# Patient Record
Sex: MALE | Race: WHITE | NOT HISPANIC OR LATINO | URBAN - METROPOLITAN AREA
[De-identification: names, ages, dates, MRNs, and addresses within clinical notes are randomized per-mention and may not be internally consistent; named-entity substitution may affect disease eponyms.]

---

## 2021-06-17 ENCOUNTER — OFFICE VISIT (OUTPATIENT)
Dept: URBAN - METROPOLITAN AREA CLINIC 105 | Facility: CLINIC | Age: 76
End: 2021-06-17
Payer: COMMERCIAL

## 2021-06-17 VITALS
WEIGHT: 150 LBS | HEART RATE: 74 BPM | DIASTOLIC BLOOD PRESSURE: 76 MMHG | SYSTOLIC BLOOD PRESSURE: 152 MMHG | TEMPERATURE: 97.9 F | BODY MASS INDEX: 23.54 KG/M2 | HEIGHT: 67 IN

## 2021-06-17 DIAGNOSIS — K29.60 EROSIVE GASTRITIS: ICD-10-CM

## 2021-06-17 DIAGNOSIS — R63.0 ANOREXIA: ICD-10-CM

## 2021-06-17 DIAGNOSIS — K52.832 LYMPHOCYTIC COLITIS: ICD-10-CM

## 2021-06-17 DIAGNOSIS — F10.10 ALCOHOL ABUSE: ICD-10-CM

## 2021-06-17 DIAGNOSIS — R19.7 DIARRHEA, UNSPECIFIED TYPE: ICD-10-CM

## 2021-06-17 DIAGNOSIS — Z86.010 HISTORY OF COLON POLYPS: ICD-10-CM

## 2021-06-17 DIAGNOSIS — M19.90 ARTHRITIS: ICD-10-CM

## 2021-06-17 DIAGNOSIS — F32.9 DEPRESSION, UNSPECIFIED DEPRESSION TYPE: ICD-10-CM

## 2021-06-17 DIAGNOSIS — D53.9 MACROCYTIC ANEMIA: ICD-10-CM

## 2021-06-17 DIAGNOSIS — R10.9 ABDOMINAL PAIN, UNSPECIFIED ABDOMINAL LOCATION: ICD-10-CM

## 2021-06-17 DIAGNOSIS — E53.8 VITAMIN B12 DEFICIENCY: ICD-10-CM

## 2021-06-17 PROCEDURE — 99213 OFFICE O/P EST LOW 20 MIN: CPT | Performed by: INTERNAL MEDICINE

## 2021-06-17 RX ORDER — DUTASTERIDE 0.5 MG/1
TAKE 1 CAPSULE (0.5 MG) BY ORAL ROUTE ONCE DAILY CAPSULE, LIQUID FILLED ORAL 1
Qty: 0 | Refills: 0 | Status: ACTIVE | COMMUNITY
Start: 1900-01-01 | End: 1900-01-01

## 2021-06-17 RX ORDER — HYALURONATE SODIUM 0.2 %
APPLY TO AFFECTED AREA(S) CREAM (GRAM) TOPICAL
Qty: 1 | Refills: 0 | Status: ACTIVE | COMMUNITY
Start: 1900-01-01 | End: 1900-01-01

## 2021-06-17 RX ORDER — PREDNISONE 5 MG/1
TAKE 1 TABLET (5 MG) BY ORAL ROUTE ONCE DAILY TABLET ORAL 1
Qty: 0 | Refills: 0 | Status: ACTIVE | COMMUNITY
Start: 1900-01-01 | End: 1900-01-01

## 2021-06-17 RX ORDER — GABAPENTIN 300 MG/1
TAKE 1 CAPSULE (300 MG) BY ORAL ROUTE ONCE PER DAY CAPSULE ORAL
Qty: 0 | Refills: 0 | Status: ACTIVE | COMMUNITY
Start: 1900-01-01 | End: 1900-01-01

## 2021-06-17 RX ORDER — AZATHIOPRINE 50 1/1
TAKE 1 TABLET (50 MG) BY ORAL ROUTE ONCE DAILY TABLET ORAL 1
Qty: 0 | Refills: 0 | Status: ACTIVE | COMMUNITY
Start: 1900-01-01 | End: 1900-01-01

## 2021-06-17 RX ORDER — SERTRALINE 100 MG/1
TAKE 1 TABLET (100 MG) BY ORAL ROUTE ONCE DAILY FOR THE 2 WEEKS BEFORE THE ONSET OF MENSES TABLET, FILM COATED ORAL 1
Qty: 0 | Refills: 0 | Status: ACTIVE | COMMUNITY
Start: 1900-01-01 | End: 1900-01-01

## 2021-06-17 RX ORDER — TACROLIMUS 1 MG/1
6MG IN THE AM AND 7MG IN THE PM CAPSULE, GELATIN COATED ORAL
Qty: 0 | Refills: 0 | Status: ACTIVE | COMMUNITY
Start: 1900-01-01 | End: 1900-01-01

## 2021-06-17 RX ORDER — MIRTAZAPINE 7.5 MG/1
TAKE 1 TABLET (7.5 MG) BY ORAL ROUTE ONCE DAILY AT BEDTIME TABLET ORAL 1
Qty: 0 | Refills: 0 | Status: ACTIVE | COMMUNITY
Start: 1900-01-01 | End: 1900-01-01

## 2021-06-17 RX ORDER — DOXEPIN HYDROCHLORIDE 10 MG/1
TAKE 1 CAPSULE BY ORAL ROUTE AS NEEDED CAPSULE ORAL
Qty: 0 | Refills: 0 | Status: ACTIVE | COMMUNITY
Start: 1900-01-01 | End: 1900-01-01

## 2021-06-17 RX ORDER — FOLIC ACID 1 MG/1
TAKE 1 TABLET (1 MG) BY ORAL ROUTE ONCE DAILY TABLET ORAL 1
Qty: 0 | Refills: 0 | Status: ACTIVE | COMMUNITY
Start: 1900-01-01 | End: 1900-01-01

## 2021-06-17 NOTE — HPI-TODAY'S VISIT:
The patient is a /White male, who presents in follow-up for lymphocytic colitis and erosive gastritis noted on his EGD on 2/27/17. Patient has a history of arthritis, peritoneal dialysis (no longer on), knee replacement in October 2015, and inguinal/umbilical hernia repair 2/2017 by Dr. Ponce. Tramadol helped with his arthritis pain. He was seen in the [facility] ED 2/1/17 because of left groin pain. A left inguinal hernia was reduced. A CT in the ED noted some gastric wall thickening. He had stopped taking Tramadol.   His regimen consisted of Entocort 3 mg daily (1/9/17 Entocort resumed at 9 mg daily and decreased to 6 mg 2/6/17, decreased to 3 mg daily on 4/14/17) and Welchol 625 mg, 3 pills daily with lunch. He has been off the omeprazole 20 mg daily and denies any upper GI issues.   On clinic visit 7/14/17, pt reported intermittent watery urgent stools on a daily basis x 2 weeks. Since then he was started on low dose Imodium 1 pill a day along with budesonide to 3 mg daily. On visit 11/22/17 he stated he was having 3 BMs daily with last one being loose. On 3/2/18, he stated his BMs have improved with 3 BMs daily and noted his stools had become less formed throughout the day. He was on budesonide 3 mg daily and WelChol 3 pills daily. He would use Imodium PRN. He was not able to try the Lomotil. On 5/15/18 he noted he was having formed/solid BMs on Welchol 3 pills daily during the week, budesonide 3 mg daily, and Lomotil 1 pill daily.  On 8/16/18, patient continued on budesonide 3 mg daily and increased to BID because of watery diarrhea.  He discontinued Welchol and continued on Lomotil 1 pill a day.  On this regimen he had a solid BM early AM; the second was formed; the third was liquid.  He stated since his last visit he had a couple of bad falls.  He was taking Naltrexone and had nausea/vomiting/abdominal pain so he stopped.  He stated he had significantly decreased his alcohol use.  He noted a significant decrease in his appetite.  On 10/4/18, the patient reported having nausea along with a headache about twice a week for a few hours which was relieved with ondansetron.  However, he had lost his bottle of medication so he had not used it recently.  The patient noted having 2 BMs per day with formed stool while on Lomotil 1 pill BID. The patient was not clear if he is taking the omeprazole 40 mg QD.  He reported he has continued budesonide 3 mg, 2 pills daily.  After his clinic visit he called back and noted he had misplaced his omeprazole bottle and had not been taking it for some time.  He resumed it.  On 10/04/2018, he stated his nausea and headaches have resolved.  He had not had any diarrhea since his last visit except on 10/04/2018.  He was usually having 2 good formed BMs/day.  He was taking budesonide 3 mg, 2 pills daily and Lomotil 1 pill BID.  On 11/29/18, the patient noted he was having regular bowel habits with formed BM. He was currently on Lomotil 1 pill BID and Budesonide 3 mg 2 pills daily.  He denied any acid reflux symptoms on omeprazole 20 mg daily.  He fell again 2 weeks prior prompting an ED visit.  He suffered bruising to left forearm.   He had an x-ray in the ED.  On 6/6/19, he noted having a renal transplant at [facility] on 1/20/19.  He was on azathioprine 50 mg daily, prednisone 5 mg daily, Prograf 5 mg AM and 6 mg PM.  He was also on B12.  He notes Dr. Joaquin noted a decrease in his hemoglobin recently.  The patient denied any blood/melena per rectum.  He was off budesonide and did not take an anti-diarrheal.  He was having a daily formed BM and can 2x/week have a second BM later in the day that is watery.  He also notes urgency.  On 7/18/19, he was following up after an EGD/Colonoscopy.  He continued on B12 daily.  He was having a formed BM 2x/day on Imodium 1 pill daily.  His colonoscopy was negative for microscopic colitis.  He had 1-2 alcohol beverages a month.  On 10/28/19, he said he d/c valacyclovir. He was now taking mirtazapine for depression. His BMs were formed 90% of the time. He had 1-2 BMs/day. He took Imodium 1 pill QD. He had been noting intermittent lower abdominal discomfort in the past month. He had it 3-4x/week and it lasted for about an hour. He described it as an urge to have a BM, but didn't. He denied cramping, bloating, and distention. He d/c omeprazole.   He had 1 beer/day. He would have 1-2 glasses of wine when guests were over. He typically had guests 2x/week. He said he did not have a good appetite. He was trying to eat more. Before his kidney transplant, he was 140 lbs. Afterwards, he dropped down to 130 lbs and said he couldn't manage to go over 135 lbs. He was not drinking Ensure or Boost. He said his depression had gotten worse, so he went to a psychiatrist who adjusted his mirtazapine dosage.   On 12/5/19, he said his creatinine had been elevated to around 1.9 (previously 1.5) and tomorrow he had a biopsy with a transplant team. He d/c the appeitite stimulant/megestrol 400 mg daily after taking for 2-3 weeks. His appetite had improved. He was eating a lot. He put on 5-6 lbs. He had only taken hyoscyamine 0.125 mg once for abdominal discomfort and it worked within 30 min. He still took Imodium 1 pill QD. He said it was effective. He only drank socially and had up to 2 glasses of wine.  Today, he says his bowel habit is fine. He takes Imodium 1 pill QD. He has 1-2 formed BMs/day. He has not been experiencing reflux symptoms or abdominal pain. His appetite is adequate. He is off vit B12. He continues on iron.  He has 2 alcohol drinks/night on the weekends and 1-2 drinks/night 1x during the week but is trying to hold on use during the week. He had COVID in Dec and lost 20 lbs during that time and was subsequently put on Marinol by his PCP. He has had recent labwork with his PCP.   Labs 7/22/19 - CMP normal except BUN 29, creatinine 1.48, sodium 134, GFR 47. CBC normal except hgb 9.8, .2. Ferritin 344.40, TIBC 246, iron sat 55%, UIBC 110, transferrin 198, vitamin B-12 1253. Folate normal. 5/10/19 Hgb 9.4, .2, B12 > 2000, Folate 10.

## 2022-05-06 ENCOUNTER — LAB OUTSIDE AN ENCOUNTER (OUTPATIENT)
Dept: URBAN - METROPOLITAN AREA TELEHEALTH 2 | Facility: TELEHEALTH | Age: 77
End: 2022-05-06

## 2022-05-06 ENCOUNTER — OFFICE VISIT (OUTPATIENT)
Dept: URBAN - METROPOLITAN AREA TELEHEALTH 2 | Facility: TELEHEALTH | Age: 77
End: 2022-05-06
Payer: COMMERCIAL

## 2022-05-06 DIAGNOSIS — R19.7 DIARRHEA, UNSPECIFIED TYPE: ICD-10-CM

## 2022-05-06 DIAGNOSIS — K52.832 LYMPHOCYTIC COLITIS: ICD-10-CM

## 2022-05-06 DIAGNOSIS — K29.60 EROSIVE GASTRITIS: ICD-10-CM

## 2022-05-06 DIAGNOSIS — R63.0 ANOREXIA: ICD-10-CM

## 2022-05-06 PROCEDURE — 99214 OFFICE O/P EST MOD 30 MIN: CPT | Performed by: INTERNAL MEDICINE

## 2022-05-06 RX ORDER — MIRTAZAPINE 7.5 MG/1
TAKE 1 TABLET (7.5 MG) BY ORAL ROUTE ONCE DAILY AT BEDTIME TABLET ORAL 1
Qty: 0 | Refills: 0 | Status: ACTIVE | COMMUNITY
Start: 1900-01-01

## 2022-05-06 RX ORDER — TACROLIMUS 1 MG/1
6MG IN THE AM AND 7MG IN THE PM CAPSULE, GELATIN COATED ORAL
Qty: 0 | Refills: 0 | Status: ACTIVE | COMMUNITY
Start: 1900-01-01

## 2022-05-06 RX ORDER — GABAPENTIN 300 MG/1
TAKE 1 CAPSULE (300 MG) BY ORAL ROUTE ONCE PER DAY CAPSULE ORAL
Qty: 0 | Refills: 0 | Status: ACTIVE | COMMUNITY
Start: 1900-01-01

## 2022-05-06 RX ORDER — MEGESTROL ACETATE 40 MG/ML
10 ML SUSPENSION ORAL ONCE A DAY
Qty: 300 ML | Refills: 2 | OUTPATIENT
Start: 2022-05-06 | End: 2022-08-04

## 2022-05-06 RX ORDER — SODIUM, POTASSIUM,MAG SULFATES 17.5-3.13G
354 ML SOLUTION, RECONSTITUTED, ORAL ORAL ONCE
Qty: 354 ML | Refills: 0 | OUTPATIENT
Start: 2022-05-06 | End: 2022-05-07

## 2022-05-06 RX ORDER — TRAVOPROST 0.04 MG/ML
INSTILL 1 DROP IN BOTH EYES EVERY MORNING SOLUTION/ DROPS OPHTHALMIC
Qty: 7.5 | Refills: 1 | Status: ACTIVE | COMMUNITY

## 2022-05-06 RX ORDER — HYALURONATE SODIUM 0.2 %
APPLY TO AFFECTED AREA(S) CREAM (GRAM) TOPICAL
Qty: 1 | Refills: 0 | Status: ACTIVE | COMMUNITY
Start: 1900-01-01

## 2022-05-06 RX ORDER — CARVEDILOL 3.12 MG/1
TAKE ONE TABLET BY MOUTH EVERY 12 HOURS TABLET, FILM COATED ORAL
Qty: 60 | Refills: 0 | Status: ACTIVE | COMMUNITY

## 2022-05-06 RX ORDER — DUTASTERIDE 0.5 MG/1
TAKE 1 CAPSULE (0.5 MG) BY ORAL ROUTE ONCE DAILY CAPSULE, LIQUID FILLED ORAL 1
Qty: 0 | Refills: 0 | Status: ACTIVE | COMMUNITY
Start: 1900-01-01

## 2022-05-06 RX ORDER — AMLODIPINE BESYLATE 10 MG/1
TABLET ORAL
Qty: 90 | Status: ACTIVE | COMMUNITY

## 2022-05-06 RX ORDER — PREDNISONE 5 MG/1
TAKE 1 TABLET (5 MG) BY ORAL ROUTE ONCE DAILY TABLET ORAL 1
Qty: 0 | Refills: 0 | Status: ACTIVE | COMMUNITY
Start: 1900-01-01

## 2022-05-06 RX ORDER — AZATHIOPRINE 50 1/1
TAKE 1 TABLET (50 MG) BY ORAL ROUTE ONCE DAILY TABLET ORAL 1
Qty: 0 | Refills: 0 | Status: ACTIVE | COMMUNITY
Start: 1900-01-01

## 2022-05-06 RX ORDER — SERTRALINE 100 MG/1
TAKE 1 TABLET (100 MG) BY ORAL ROUTE ONCE DAILY FOR THE 2 WEEKS BEFORE THE ONSET OF MENSES TABLET, FILM COATED ORAL 1
Qty: 0 | Refills: 0 | Status: ACTIVE | COMMUNITY
Start: 1900-01-01

## 2022-05-06 RX ORDER — FOLIC ACID 1 MG/1
TAKE 1 TABLET (1 MG) BY ORAL ROUTE ONCE DAILY TABLET ORAL 1
Qty: 0 | Refills: 0 | Status: ACTIVE | COMMUNITY
Start: 1900-01-01

## 2022-05-06 RX ORDER — DOXEPIN HYDROCHLORIDE 10 MG/1
TAKE 1 CAPSULE BY ORAL ROUTE AS NEEDED CAPSULE ORAL
Qty: 0 | Refills: 0 | Status: ACTIVE | COMMUNITY
Start: 1900-01-01

## 2022-05-06 NOTE — HPI-TODAY'S VISIT:
PAST MEDICAL HISTORY: The patient is a /White male, who presents in follow-up for lymphocytic colitis and erosive gastritis noted on his EGD on 2/27/17. Patient has a history of arthritis, peritoneal dialysis (no longer on), knee replacement in October 2015, and inguinal/umbilical hernia repair 2/2017 by Dr. Ponce. Tramadol helped with his arthritis pain. He was seen in the [facility] ED 2/1/17 because of left groin pain. A left inguinal hernia was reduced. A CT in the ED noted some gastric wall thickening. He had stopped taking Tramadol.   His regimen consisted of Entocort 3 mg daily (1/9/17 Entocort resumed at 9 mg daily and decreased to 6 mg 2/6/17, decreased to 3 mg daily on 4/14/17) and Welchol 625 mg, 3 pills daily with lunch. He has been off the omeprazole 20 mg daily and denies any upper GI issues.   On clinic visit 7/14/17, pt reported intermittent watery urgent stools on a daily basis x 2 weeks. Since then he was started on low dose Imodium 1 pill a day along with budesonide to 3 mg daily. On visit 11/22/17 he stated he was having 3 BMs daily with last one being loose. On 3/2/18, he stated his BMs have improved with 3 BMs daily and noted his stools had become less formed throughout the day. He was on budesonide 3 mg daily and WelChol 3 pills daily. He would use Imodium PRN. He was not able to try the Lomotil. On 5/15/18 he noted he was having formed/solid BMs on Welchol 3 pills daily during the week, budesonide 3 mg daily, and Lomotil 1 pill daily.  On 8/16/18, patient continued on budesonide 3 mg daily and increased to BID because of watery diarrhea.  He discontinued Welchol and continued on Lomotil 1 pill a day.  On this regimen he had a solid BM early AM; the second was formed; the third was liquid.  He stated since his last visit he had a couple of bad falls.  He was taking Naltrexone and had nausea/vomiting/abdominal pain so he stopped.  He stated he had significantly decreased his alcohol use.  He noted a significant decrease in his appetite.  On 10/4/18, the patient reported having nausea along with a headache about twice a week for a few hours which was relieved with ondansetron.  However, he had lost his bottle of medication so he had not used it recently.  The patient noted having 2 BMs per day with formed stool while on Lomotil 1 pill BID. The patient was not clear if he is taking the omeprazole 40 mg QD.  He reported he has continued budesonide 3 mg, 2 pills daily.  After his clinic visit he called back and noted he had misplaced his omeprazole bottle and had not been taking it for some time.  He resumed it.  On 10/04/2018, he stated his nausea and headaches have resolved.  He had not had any diarrhea since his last visit except on 10/04/2018.  He was usually having 2 good formed BMs/day.  He was taking budesonide 3 mg, 2 pills daily and Lomotil 1 pill BID.  On 11/29/18, the patient noted he was having regular bowel habits with formed BM. He was currently on Lomotil 1 pill BID and Budesonide 3 mg 2 pills daily.  He denied any acid reflux symptoms on omeprazole 20 mg daily.  He fell again 2 weeks prior prompting an ED visit.  He suffered bruising to left forearm.   He had an x-ray in the ED.  On 6/6/19, he noted having a renal transplant at [facility] on 1/20/19.  He was on azathioprine 50 mg daily, prednisone 5 mg daily, Prograf 5 mg AM and 6 mg PM.  He was also on B12.  He notes Dr. Joaquin noted a decrease in his hemoglobin recently.  The patient denied any blood/melena per rectum.  He was off budesonide and did not take an anti-diarrheal.  He was having a daily formed BM and can 2x/week have a second BM later in the day that is watery.  He also notes urgency.  On 7/18/19, he was following up after an EGD/Colonoscopy.  He continued on B12 daily.  He was having a formed BM 2x/day on Imodium 1 pill daily.  His colonoscopy was negative for microscopic colitis.  He had 1-2 alcohol beverages a month.  On 10/28/19, he said he d/c valacyclovir. He was now taking mirtazapine for depression. His BMs were formed 90% of the time. He had 1-2 BMs/day. He took Imodium 1 pill QD. He had been noting intermittent lower abdominal discomfort in the past month. He had it 3-4x/week and it lasted for about an hour. He described it as an urge to have a BM, but didn't. He denied cramping, bloating, and distention. He d/c omeprazole.   He had 1 beer/day. He would have 1-2 glasses of wine when guests were over. He typically had guests 2x/week. He said he did not have a good appetite. He was trying to eat more. Before his kidney transplant, he was 140 lbs. Afterwards, he dropped down to 130 lbs and said he couldn't manage to go over 135 lbs. He was not drinking Ensure or Boost. He said his depression had gotten worse, so he went to a psychiatrist who adjusted his mirtazapine dosage.   On 12/5/19, he said his creatinine had been elevated to around 1.9 (previously 1.5) and tomorrow he had a biopsy with a transplant team. He d/c the appeitite stimulant/megestrol 400 mg daily after taking for 2-3 weeks. His appetite had improved. He was eating a lot. He put on 5-6 lbs. He had only taken hyoscyamine 0.125 mg once for abdominal discomfort and it worked within 30 min. He still took Imodium 1 pill QD. He said it was effective. He only drank socially and had up to 2 glasses of wine.  On 6/17/21, he said his bowel habit was fine. He took Imodium 1 pill QD. He had 1-2 formed BMs/day. He had not been experiencing reflux symptoms or abdominal pain. His appetite was adequate. He was off vit B12. He continued on iron.  He had 2 alcohol drinks/night on the weekends and 1-2 drinks/night 1x during the week but was trying to hold on use during the week. He had COVID in Dec and lost 20 lbs during that time and was subsequently put on Marinol by his PCP. He had recent labwork with his PCP.  HPI:   Today, he says he is on medication for anxiety and depression which works. He had COVID last year and lost 30 lbs as a result. He was put on Marinol to stimulate his appetite and gained 10 lbs, but it is difficult for him to keep his weight up. Dr. Joaquin ordered a CT today. He follows with a nutritionist - 1x/2 weeks - increasing caloric intake. He is not drinking any supplements, but had planned to start drinking Kefir. He is currently 138-139 lbs. He had taken Marinol for 2-3 weeks and his weight increased to 145 lbs.  He notes Marinol caused him to get lost in his own home so he would take it before bed.  He "vaguely" remembers taking Megace, but does not remember the benefit.   He will see Dr. Joaquin next month. Most recent labs were 2 weeks ago. Alcohol use is now only on weekends - 2 glasses of wine on Friday and 2 glasses on Saturday. Recent liver enzyme levels were normal he states. He is not on B12 supplementation or omeprazole.   Labs 5/21/21 - COVID positive. CBC normal except hgb 12.8, .2. CMP normal except BUN 30, creatinine 1.46, GFR 47. Chol 252, , non . 7/22/19 - CMP normal except BUN 29, creatinine 1.48, sodium 134, GFR 47. CBC normal except hgb 9.8, .2. Ferritin 344.40, TIBC 246, iron sat 55%, UIBC 110, transferrin 198, vitamin B-12 1253. Folate normal. 5/10/19 Hgb 9.4, .2, B12 > 2000, Folate 10.

## 2022-05-12 ENCOUNTER — TELEPHONE ENCOUNTER (OUTPATIENT)
Dept: URBAN - METROPOLITAN AREA CLINIC 105 | Facility: CLINIC | Age: 77
End: 2022-05-12

## 2022-05-31 ENCOUNTER — OFFICE VISIT (OUTPATIENT)
Dept: URBAN - METROPOLITAN AREA MEDICAL CENTER 33 | Facility: MEDICAL CENTER | Age: 77
End: 2022-05-31
Payer: COMMERCIAL

## 2022-05-31 DIAGNOSIS — D12.4 ADENOMA OF DESCENDING COLON: ICD-10-CM

## 2022-05-31 DIAGNOSIS — D12.2 ADENOMA OF ASCENDING COLON: ICD-10-CM

## 2022-05-31 DIAGNOSIS — D12.3 ADENOMA OF TRANSVERSE COLON: ICD-10-CM

## 2022-05-31 DIAGNOSIS — D12.5 ADENOMA OF SIGMOID COLON: ICD-10-CM

## 2022-05-31 DIAGNOSIS — Z86.010 ADENOMAS PERSONAL HISTORY OF COLONIC POLYPS: ICD-10-CM

## 2022-05-31 DIAGNOSIS — K63.89 BACTERIAL OVERGROWTH SYNDROME: ICD-10-CM

## 2022-05-31 PROCEDURE — 45380 COLONOSCOPY AND BIOPSY: CPT | Performed by: INTERNAL MEDICINE

## 2022-05-31 PROCEDURE — 45385 COLONOSCOPY W/LESION REMOVAL: CPT | Performed by: INTERNAL MEDICINE

## 2022-05-31 RX ORDER — TRAVOPROST 0.04 MG/ML
INSTILL 1 DROP IN BOTH EYES EVERY MORNING SOLUTION/ DROPS OPHTHALMIC
Qty: 7.5 | Refills: 1 | Status: ACTIVE | COMMUNITY

## 2022-05-31 RX ORDER — FOLIC ACID 1 MG/1
TAKE 1 TABLET (1 MG) BY ORAL ROUTE ONCE DAILY TABLET ORAL 1
Qty: 0 | Refills: 0 | Status: ACTIVE | COMMUNITY
Start: 1900-01-01

## 2022-05-31 RX ORDER — GABAPENTIN 300 MG/1
TAKE 1 CAPSULE (300 MG) BY ORAL ROUTE ONCE PER DAY CAPSULE ORAL
Qty: 0 | Refills: 0 | Status: ACTIVE | COMMUNITY
Start: 1900-01-01

## 2022-05-31 RX ORDER — DUTASTERIDE 0.5 MG/1
TAKE 1 CAPSULE (0.5 MG) BY ORAL ROUTE ONCE DAILY CAPSULE, LIQUID FILLED ORAL 1
Qty: 0 | Refills: 0 | Status: ACTIVE | COMMUNITY
Start: 1900-01-01

## 2022-05-31 RX ORDER — TACROLIMUS 1 MG/1
6MG IN THE AM AND 7MG IN THE PM CAPSULE, GELATIN COATED ORAL
Qty: 0 | Refills: 0 | Status: ACTIVE | COMMUNITY
Start: 1900-01-01

## 2022-05-31 RX ORDER — MIRTAZAPINE 7.5 MG/1
TAKE 1 TABLET (7.5 MG) BY ORAL ROUTE ONCE DAILY AT BEDTIME TABLET ORAL 1
Qty: 0 | Refills: 0 | Status: ACTIVE | COMMUNITY
Start: 1900-01-01

## 2022-05-31 RX ORDER — HYALURONATE SODIUM 0.2 %
APPLY TO AFFECTED AREA(S) CREAM (GRAM) TOPICAL
Qty: 1 | Refills: 0 | Status: ACTIVE | COMMUNITY
Start: 1900-01-01

## 2022-05-31 RX ORDER — MEGESTROL ACETATE 40 MG/ML
10 ML SUSPENSION ORAL ONCE A DAY
Qty: 300 ML | Refills: 2 | Status: ACTIVE | COMMUNITY
Start: 2022-05-06 | End: 2022-08-04

## 2022-05-31 RX ORDER — PREDNISONE 5 MG/1
TAKE 1 TABLET (5 MG) BY ORAL ROUTE ONCE DAILY TABLET ORAL 1
Qty: 0 | Refills: 0 | Status: ACTIVE | COMMUNITY
Start: 1900-01-01

## 2022-05-31 RX ORDER — CARVEDILOL 3.12 MG/1
TAKE ONE TABLET BY MOUTH EVERY 12 HOURS TABLET, FILM COATED ORAL
Qty: 60 | Refills: 0 | Status: ACTIVE | COMMUNITY

## 2022-05-31 RX ORDER — DOXEPIN HYDROCHLORIDE 10 MG/1
TAKE 1 CAPSULE BY ORAL ROUTE AS NEEDED CAPSULE ORAL
Qty: 0 | Refills: 0 | Status: ACTIVE | COMMUNITY
Start: 1900-01-01

## 2022-05-31 RX ORDER — SERTRALINE 100 MG/1
TAKE 1 TABLET (100 MG) BY ORAL ROUTE ONCE DAILY FOR THE 2 WEEKS BEFORE THE ONSET OF MENSES TABLET, FILM COATED ORAL 1
Qty: 0 | Refills: 0 | Status: ACTIVE | COMMUNITY
Start: 1900-01-01

## 2022-05-31 RX ORDER — AMLODIPINE BESYLATE 10 MG/1
TABLET ORAL
Qty: 90 | Status: ACTIVE | COMMUNITY

## 2022-05-31 RX ORDER — AZATHIOPRINE 50 1/1
TAKE 1 TABLET (50 MG) BY ORAL ROUTE ONCE DAILY TABLET ORAL 1
Qty: 0 | Refills: 0 | Status: ACTIVE | COMMUNITY
Start: 1900-01-01

## 2022-07-08 ENCOUNTER — OFFICE VISIT (OUTPATIENT)
Dept: URBAN - METROPOLITAN AREA TELEHEALTH 2 | Facility: TELEHEALTH | Age: 77
End: 2022-07-08
Payer: COMMERCIAL

## 2022-07-08 ENCOUNTER — LAB OUTSIDE AN ENCOUNTER (OUTPATIENT)
Dept: URBAN - METROPOLITAN AREA TELEHEALTH 2 | Facility: TELEHEALTH | Age: 77
End: 2022-07-08

## 2022-07-08 DIAGNOSIS — M19.90 ARTHRITIS: ICD-10-CM

## 2022-07-08 DIAGNOSIS — R63.0 ANOREXIA: ICD-10-CM

## 2022-07-08 DIAGNOSIS — K52.832 LYMPHOCYTIC COLITIS: ICD-10-CM

## 2022-07-08 DIAGNOSIS — R19.7 DIARRHEA, UNSPECIFIED TYPE: ICD-10-CM

## 2022-07-08 PROCEDURE — 99214 OFFICE O/P EST MOD 30 MIN: CPT | Performed by: INTERNAL MEDICINE

## 2022-07-08 RX ORDER — GABAPENTIN 300 MG/1
TAKE 1 CAPSULE (300 MG) BY ORAL ROUTE ONCE PER DAY CAPSULE ORAL
Qty: 0 | Refills: 0 | Status: ACTIVE | COMMUNITY
Start: 1900-01-01

## 2022-07-08 RX ORDER — MEGESTROL ACETATE 40 MG/ML
10 ML SUSPENSION ORAL TWICE A DAY
Qty: 600 ML | Refills: 2 | OUTPATIENT
Start: 2022-05-06 | End: 2022-10-06

## 2022-07-08 RX ORDER — DOXEPIN HYDROCHLORIDE 10 MG/1
TAKE 1 CAPSULE BY ORAL ROUTE AS NEEDED CAPSULE ORAL
Qty: 0 | Refills: 0 | Status: ACTIVE | COMMUNITY
Start: 1900-01-01

## 2022-07-08 RX ORDER — AZATHIOPRINE 50 1/1
TAKE 1 TABLET (50 MG) BY ORAL ROUTE ONCE DAILY TABLET ORAL 1
Qty: 0 | Refills: 0 | Status: ACTIVE | COMMUNITY
Start: 1900-01-01

## 2022-07-08 RX ORDER — SERTRALINE 100 MG/1
TAKE 1 TABLET (100 MG) BY ORAL ROUTE ONCE DAILY FOR THE 2 WEEKS BEFORE THE ONSET OF MENSES TABLET, FILM COATED ORAL 1
Qty: 0 | Refills: 0 | Status: ACTIVE | COMMUNITY
Start: 1900-01-01

## 2022-07-08 RX ORDER — HYALURONATE SODIUM 0.2 %
APPLY TO AFFECTED AREA(S) CREAM (GRAM) TOPICAL
Qty: 1 | Refills: 0 | Status: ACTIVE | COMMUNITY
Start: 1900-01-01

## 2022-07-08 RX ORDER — CARVEDILOL 3.12 MG/1
TAKE ONE TABLET BY MOUTH EVERY 12 HOURS TABLET, FILM COATED ORAL
Qty: 60 | Refills: 0 | Status: ACTIVE | COMMUNITY

## 2022-07-08 RX ORDER — AMLODIPINE BESYLATE 10 MG/1
TABLET ORAL
Qty: 90 | Status: ACTIVE | COMMUNITY

## 2022-07-08 RX ORDER — TACROLIMUS 1 MG/1
6MG IN THE AM AND 7MG IN THE PM CAPSULE, GELATIN COATED ORAL
Qty: 0 | Refills: 0 | Status: ACTIVE | COMMUNITY
Start: 1900-01-01

## 2022-07-08 RX ORDER — PREDNISONE 5 MG/1
TAKE 1 TABLET (5 MG) BY ORAL ROUTE ONCE DAILY TABLET ORAL 1
Qty: 0 | Refills: 0 | Status: ACTIVE | COMMUNITY
Start: 1900-01-01

## 2022-07-08 RX ORDER — TRAVOPROST 0.04 MG/ML
INSTILL 1 DROP IN BOTH EYES EVERY MORNING SOLUTION/ DROPS OPHTHALMIC
Qty: 7.5 | Refills: 1 | Status: ACTIVE | COMMUNITY

## 2022-07-08 RX ORDER — MIRTAZAPINE 7.5 MG/1
TAKE 1 TABLET (7.5 MG) BY ORAL ROUTE ONCE DAILY AT BEDTIME TABLET ORAL 1
Qty: 0 | Refills: 0 | Status: ACTIVE | COMMUNITY
Start: 1900-01-01

## 2022-07-08 RX ORDER — SODIUM, POTASSIUM,MAG SULFATES 17.5-3.13G
354 ML SOLUTION, RECONSTITUTED, ORAL ORAL ONCE
Qty: 354 ML | Refills: 1 | OUTPATIENT
Start: 2022-07-08 | End: 2022-07-10

## 2022-07-08 RX ORDER — MEGESTROL ACETATE 40 MG/ML
10 ML SUSPENSION ORAL ONCE A DAY
Qty: 300 ML | Refills: 2 | Status: ACTIVE | COMMUNITY
Start: 2022-05-06 | End: 2022-08-04

## 2022-07-08 RX ORDER — FOLIC ACID 1 MG/1
TAKE 1 TABLET (1 MG) BY ORAL ROUTE ONCE DAILY TABLET ORAL 1
Qty: 0 | Refills: 0 | Status: ACTIVE | COMMUNITY
Start: 1900-01-01

## 2022-07-08 RX ORDER — DUTASTERIDE 0.5 MG/1
TAKE 1 CAPSULE (0.5 MG) BY ORAL ROUTE ONCE DAILY CAPSULE, LIQUID FILLED ORAL 1
Qty: 0 | Refills: 0 | Status: ACTIVE | COMMUNITY
Start: 1900-01-01

## 2022-07-08 NOTE — HPI-TODAY'S VISIT:
PAST MEDICAL HISTORY: The patient is a /White male, who presents in follow-up for lymphocytic colitis and erosive gastritis noted on his EGD on 2/27/17. Patient has a history of arthritis, peritoneal dialysis (no longer on), knee replacement in October 2015, and inguinal/umbilical hernia repair 2/2017 by Dr. Ponce. Tramadol helped with his arthritis pain. He was seen in the [facility] ED 2/1/17 because of left groin pain. A left inguinal hernia was reduced. A CT in the ED noted some gastric wall thickening. He had stopped taking Tramadol.   His regimen consisted of Entocort 3 mg daily (1/9/17 Entocort resumed at 9 mg daily and decreased to 6 mg 2/6/17, decreased to 3 mg daily on 4/14/17) and Welchol 625 mg, 3 pills daily with lunch. He has been off the omeprazole 20 mg daily and denies any upper GI issues.   On clinic visit 7/14/17, pt reported intermittent watery urgent stools on a daily basis x 2 weeks. Since then he was started on low dose Imodium 1 pill a day along with budesonide to 3 mg daily. On visit 11/22/17 he stated he was having 3 BMs daily with last one being loose. On 3/2/18, he stated his BMs have improved with 3 BMs daily and noted his stools had become less formed throughout the day. He was on budesonide 3 mg daily and WelChol 3 pills daily. He would use Imodium PRN. He was not able to try the Lomotil. On 5/15/18 he noted he was having formed/solid BMs on Welchol 3 pills daily during the week, budesonide 3 mg daily, and Lomotil 1 pill daily.  On 8/16/18, patient continued on budesonide 3 mg daily and increased to BID because of watery diarrhea.  He discontinued Welchol and continued on Lomotil 1 pill a day.  On this regimen he had a solid BM early AM; the second was formed; the third was liquid.  He stated since his last visit he had a couple of bad falls.  He was taking Naltrexone and had nausea/vomiting/abdominal pain so he stopped.  He stated he had significantly decreased his alcohol use.  He noted a significant decrease in his appetite.  On 10/4/18, the patient reported having nausea along with a headache about twice a week for a few hours which was relieved with ondansetron.  However, he had lost his bottle of medication so he had not used it recently.  The patient noted having 2 BMs per day with formed stool while on Lomotil 1 pill BID. The patient was not clear if he is taking the omeprazole 40 mg QD.  He reported he has continued budesonide 3 mg, 2 pills daily.  After his clinic visit he called back and noted he had misplaced his omeprazole bottle and had not been taking it for some time.  He resumed it.  On 10/04/2018, he stated his nausea and headaches have resolved.  He had not had any diarrhea since his last visit except on 10/04/2018.  He was usually having 2 good formed BMs/day.  He was taking budesonide 3 mg, 2 pills daily and Lomotil 1 pill BID.  On 11/29/18, the patient noted he was having regular bowel habits with formed BM. He was currently on Lomotil 1 pill BID and Budesonide 3 mg 2 pills daily.  He denied any acid reflux symptoms on omeprazole 20 mg daily.  He fell again 2 weeks prior prompting an ED visit.  He suffered bruising to left forearm.   He had an x-ray in the ED.  On 6/6/19, he noted having a renal transplant at [place] on 1/20/19.  He was on azathioprine 50 mg daily, prednisone 5 mg daily, Prograf 5 mg AM and 6 mg PM.  He was also on B12.  He notes Dr. Joaquin noted a decrease in his hemoglobin recently.  The patient denied any blood/melena per rectum.  He was off budesonide and did not take an anti-diarrheal.  He was having a daily formed BM and can 2x/week have a second BM later in the day that is watery.  He also notes urgency.  On 7/18/19, he was following up after an EGD/Colonoscopy.  He continued on B12 daily.  He was having a formed BM 2x/day on Imodium 1 pill daily.  His colonoscopy was negative for microscopic colitis.  He had 1-2 alcohol beverages a month.  On 10/28/19, he said he d/c valacyclovir. He was now taking mirtazapine for depression. His BMs were formed 90% of the time. He had 1-2 BMs/day. He took Imodium 1 pill QD. He had been noting intermittent lower abdominal discomfort in the past month. He had it 3-4x/week and it lasted for about an hour. He described it as an urge to have a BM, but didn't. He denied cramping, bloating, and distention. He d/c omeprazole.   He had 1 beer/day. He would have 1-2 glasses of wine when guests were over. He typically had guests 2x/week. He said he did not have a good appetite. He was trying to eat more. Before his kidney transplant, he was 140 lbs. Afterwards, he dropped down to 130 lbs and said he couldn't manage to go over 135 lbs. He was not drinking Ensure or Boost. He said his depression had gotten worse, so he went to a psychiatrist who adjusted his mirtazapine dosage.   On 12/5/19, he said his creatinine had been elevated to around 1.9 (previously 1.5) and tomorrow he had a biopsy with a transplant team. He d/c the appeitite stimulant/megestrol 400 mg daily after taking for 2-3 weeks. His appetite had improved. He was eating a lot. He put on 5-6 lbs. He had only taken hyoscyamine 0.125 mg once for abdominal discomfort and it worked within 30 min. He still took Imodium 1 pill QD. He said it was effective. He only drank socially and had up to 2 glasses of wine.  On 6/17/21, he said his bowel habit was fine. He took Imodium 1 pill QD. He had 1-2 formed BMs/day. He had not been experiencing reflux symptoms or abdominal pain. His appetite was adequate. He was off vit B12. He continued on iron.  He had 2 alcohol drinks/night on the weekends and 1-2 drinks/night 1x during the week but was trying to hold on use during the week. He had COVID in Dec and lost 20 lbs during that time and was subsequently put on Marinol by his PCP. He had recent labwork with his PCP.  On 5/6/22, he said he was on medication for anxiety and depression which worked. He had COVID last year and lost 30 lbs as a result. He was put on Marinol to stimulate his appetite and gained 10 lbs, but it was difficult for him to keep his weight up. Dr. Joaquin ordered a CT today. He followed with a nutritionist - 1x/2 weeks - increasing caloric intake. He was not drinking any supplements, but had planned to start drinking Kefir. He was currently 138-139 lbs. He had taken Marinol for 2-3 weeks and his weight increased to 145 lbs.  He noted Marinol caused him to get lost in his own home so he would take it before bed.  He "vaguely" remembered taking Megace, but did not remember the benefit.   He would see Dr. Joaquin next month. Most recent labs were 2 weeks ago. Alcohol use was now only on weekends - 2 glasses of wine on Friday and 2 glasses on Saturday. Recent liver enzyme levels were normal he stated. He was not on B12 supplementation or omeprazole.  HPI: Today, he says he has been on Megace 400 mg daily for about 5 weeks - some improvement. He works with a nutritionist. He wants to get back above 140 lbs. He has been having urgent BMs - now taking Imodium 1 pill BID which helps. After the colonoscopy, he would have a good BM earlier in the day followed by urgent BMs later. He has 4-5 BMs QD that is watery 1-2x/day.   Labs 5/21/21 - COVID positive. CBC normal except hgb 12.8, .2. CMP normal except BUN 30, creatinine 1.46, GFR 47. Chol 252, , non . 7/22/19 - CMP normal except BUN 29, creatinine 1.48, sodium 134, GFR 47. CBC normal except hgb 9.8, .2. Ferritin 344.40, TIBC 246, iron sat 55%, UIBC 110, transferrin 198, vitamin B-12 1253. Folate normal. 5/10/19 Hgb 9.4, .2, B12 > 2000, Folate 10.

## 2022-07-15 ENCOUNTER — TELEPHONE ENCOUNTER (OUTPATIENT)
Dept: URBAN - METROPOLITAN AREA CLINIC 105 | Facility: CLINIC | Age: 77
End: 2022-07-15

## 2022-07-19 ENCOUNTER — OFFICE VISIT (OUTPATIENT)
Dept: URBAN - METROPOLITAN AREA CLINIC 105 | Facility: CLINIC | Age: 77
End: 2022-07-19
Payer: COMMERCIAL

## 2022-07-19 ENCOUNTER — WEB ENCOUNTER (OUTPATIENT)
Dept: URBAN - METROPOLITAN AREA CLINIC 105 | Facility: CLINIC | Age: 77
End: 2022-07-19

## 2022-07-19 VITALS
BODY MASS INDEX: 24.53 KG/M2 | DIASTOLIC BLOOD PRESSURE: 80 MMHG | WEIGHT: 152.6 LBS | HEIGHT: 66 IN | TEMPERATURE: 97.2 F | SYSTOLIC BLOOD PRESSURE: 157 MMHG | HEART RATE: 77 BPM

## 2022-07-19 DIAGNOSIS — R19.7 DIARRHEA, UNSPECIFIED TYPE: ICD-10-CM

## 2022-07-19 DIAGNOSIS — E53.8 VITAMIN B12 DEFICIENCY: ICD-10-CM

## 2022-07-19 DIAGNOSIS — M19.90 ARTHRITIS: ICD-10-CM

## 2022-07-19 DIAGNOSIS — K29.60 EROSIVE GASTRITIS: ICD-10-CM

## 2022-07-19 DIAGNOSIS — Z86.010 HISTORY OF COLON POLYPS: ICD-10-CM

## 2022-07-19 DIAGNOSIS — R63.0 ANOREXIA: ICD-10-CM

## 2022-07-19 DIAGNOSIS — D53.9 MACROCYTIC ANEMIA: ICD-10-CM

## 2022-07-19 DIAGNOSIS — Z94.0 RENAL TRANSPLANT RECIPIENT: ICD-10-CM

## 2022-07-19 DIAGNOSIS — F10.10 ALCOHOL ABUSE: ICD-10-CM

## 2022-07-19 DIAGNOSIS — K52.832 LYMPHOCYTIC COLITIS: ICD-10-CM

## 2022-07-19 DIAGNOSIS — R10.9 ABDOMINAL PAIN, UNSPECIFIED ABDOMINAL LOCATION: ICD-10-CM

## 2022-07-19 DIAGNOSIS — F32.9 DEPRESSION, UNSPECIFIED DEPRESSION TYPE: ICD-10-CM

## 2022-07-19 PROCEDURE — 99213 OFFICE O/P EST LOW 20 MIN: CPT | Performed by: INTERNAL MEDICINE

## 2022-07-19 RX ORDER — CARVEDILOL 3.12 MG/1
TAKE ONE TABLET BY MOUTH EVERY 12 HOURS TABLET, FILM COATED ORAL
Qty: 60 | Refills: 0 | Status: ACTIVE | COMMUNITY

## 2022-07-19 RX ORDER — HYALURONATE SODIUM 0.2 %
APPLY TO AFFECTED AREA(S) CREAM (GRAM) TOPICAL
Qty: 1 | Refills: 0 | Status: ACTIVE | COMMUNITY
Start: 1900-01-01

## 2022-07-19 RX ORDER — DUTASTERIDE 0.5 MG/1
TAKE 1 CAPSULE (0.5 MG) BY ORAL ROUTE ONCE DAILY CAPSULE, LIQUID FILLED ORAL 1
Qty: 0 | Refills: 0 | Status: ACTIVE | COMMUNITY
Start: 1900-01-01

## 2022-07-19 RX ORDER — FOLIC ACID 1 MG/1
TAKE 1 TABLET (1 MG) BY ORAL ROUTE ONCE DAILY TABLET ORAL 1
Qty: 0 | Refills: 0 | Status: ACTIVE | COMMUNITY
Start: 1900-01-01

## 2022-07-19 RX ORDER — TACROLIMUS 1 MG/1
6MG IN THE AM AND 7MG IN THE PM CAPSULE, GELATIN COATED ORAL
Qty: 0 | Refills: 0 | Status: ACTIVE | COMMUNITY
Start: 1900-01-01

## 2022-07-19 RX ORDER — SERTRALINE 100 MG/1
TAKE 1 TABLET (100 MG) BY ORAL ROUTE ONCE DAILY FOR THE 2 WEEKS BEFORE THE ONSET OF MENSES TABLET, FILM COATED ORAL 1
Qty: 0 | Refills: 0 | Status: ACTIVE | COMMUNITY
Start: 1900-01-01

## 2022-07-19 RX ORDER — AZATHIOPRINE 50 1/1
TAKE 1 TABLET (50 MG) BY ORAL ROUTE ONCE DAILY TABLET ORAL 1
Qty: 0 | Refills: 0 | Status: ACTIVE | COMMUNITY
Start: 1900-01-01

## 2022-07-19 RX ORDER — PREDNISONE 5 MG/1
TAKE 1 TABLET (5 MG) BY ORAL ROUTE ONCE DAILY TABLET ORAL 1
Qty: 0 | Refills: 0 | Status: ACTIVE | COMMUNITY
Start: 1900-01-01

## 2022-07-19 RX ORDER — DOXEPIN HYDROCHLORIDE 10 MG/1
TAKE 1 CAPSULE BY ORAL ROUTE AS NEEDED CAPSULE ORAL
Qty: 0 | Refills: 0 | Status: ACTIVE | COMMUNITY
Start: 1900-01-01

## 2022-07-19 RX ORDER — GABAPENTIN 300 MG/1
TAKE 1 CAPSULE (300 MG) BY ORAL ROUTE ONCE PER DAY CAPSULE ORAL
Qty: 0 | Refills: 0 | Status: ACTIVE | COMMUNITY
Start: 1900-01-01

## 2022-07-19 RX ORDER — MIRTAZAPINE 7.5 MG/1
TAKE 1 TABLET (7.5 MG) BY ORAL ROUTE ONCE DAILY AT BEDTIME TABLET ORAL 1
Qty: 0 | Refills: 0 | Status: ACTIVE | COMMUNITY
Start: 1900-01-01

## 2022-07-19 RX ORDER — TRAVOPROST 0.04 MG/ML
INSTILL 1 DROP IN BOTH EYES EVERY MORNING SOLUTION/ DROPS OPHTHALMIC
Qty: 7.5 | Refills: 1 | Status: ACTIVE | COMMUNITY

## 2022-07-19 RX ORDER — MEGESTROL ACETATE 40 MG/ML
10 ML SUSPENSION ORAL TWICE A DAY
Qty: 600 ML | Refills: 2 | Status: ACTIVE | COMMUNITY
Start: 2022-05-06 | End: 2022-10-06

## 2022-07-19 RX ORDER — AMLODIPINE BESYLATE 10 MG/1
TABLET ORAL
Qty: 90 | Status: ACTIVE | COMMUNITY

## 2022-07-19 NOTE — HPI-TODAY'S VISIT:
PAST MEDICAL HISTORY: The patient is a /White male, who presents in follow-up for lymphocytic colitis and erosive gastritis noted on his EGD on 2/27/17. Patient has a history of arthritis, peritoneal dialysis (no longer on), knee replacement in October 2015, and inguinal/umbilical hernia repair 2/2017 by Dr. Ponce. Tramadol helped with his arthritis pain. He was seen in the [facility] ED 2/1/17 because of left groin pain. A left inguinal hernia was reduced. A CT in the ED noted some gastric wall thickening. He had stopped taking Tramadol.   His regimen consisted of Entocort 3 mg daily (1/9/17 Entocort resumed at 9 mg daily and decreased to 6 mg 2/6/17, decreased to 3 mg daily on 4/14/17) and Welchol 625 mg, 3 pills daily with lunch. He has been off the omeprazole 20 mg daily and denies any upper GI issues.   On clinic visit 7/14/17, pt reported intermittent watery urgent stools on a daily basis x 2 weeks. Since then he was started on low dose Imodium 1 pill a day along with budesonide to 3 mg daily. On visit 11/22/17 he stated he was having 3 BMs daily with last one being loose. On 3/2/18, he stated his BMs have improved with 3 BMs daily and noted his stools had become less formed throughout the day. He was on budesonide 3 mg daily and WelChol 3 pills daily. He would use Imodium PRN. He was not able to try the Lomotil. On 5/15/18 he noted he was having formed/solid BMs on Welchol 3 pills daily during the week, budesonide 3 mg daily, and Lomotil 1 pill daily.  On 8/16/18, patient continued on budesonide 3 mg daily and increased to BID because of watery diarrhea.  He discontinued Welchol and continued on Lomotil 1 pill a day.  On this regimen he had a solid BM early AM; the second was formed; the third was liquid.  He stated since his last visit he had a couple of bad falls.  He was taking Naltrexone and had nausea/vomiting/abdominal pain so he stopped.  He stated he had significantly decreased his alcohol use.  He noted a significant decrease in his appetite.  On 10/4/18, the patient reported having nausea along with a headache about twice a week for a few hours which was relieved with ondansetron.  However, he had lost his bottle of medication so he had not used it recently.  The patient noted having 2 BMs per day with formed stool while on Lomotil 1 pill BID. The patient was not clear if he is taking the omeprazole 40 mg QD.  He reported he has continued budesonide 3 mg, 2 pills daily.  After his clinic visit he called back and noted he had misplaced his omeprazole bottle and had not been taking it for some time.  He resumed it.  On 10/04/2018, he stated his nausea and headaches have resolved.  He had not had any diarrhea since his last visit except on 10/04/2018.  He was usually having 2 good formed BMs/day.  He was taking budesonide 3 mg, 2 pills daily and Lomotil 1 pill BID.  On 11/29/18, the patient noted he was having regular bowel habits with formed BM. He was currently on Lomotil 1 pill BID and Budesonide 3 mg 2 pills daily.  He denied any acid reflux symptoms on omeprazole 20 mg daily.  He fell again 2 weeks prior prompting an ED visit.  He suffered bruising to left forearm.   He had an x-ray in the ED.  On 6/6/19, he noted having a renal transplant at [facility] on 1/20/19.  He was on azathioprine 50 mg daily, prednisone 5 mg daily, Prograf 5 mg AM and 6 mg PM.  He was also on B12.  He notes Dr. Joaquin noted a decrease in his hemoglobin recently.  The patient denied any blood/melena per rectum.  He was off budesonide and did not take an anti-diarrheal.  He was having a daily formed BM and can 2x/week have a second BM later in the day that is watery.  He also notes urgency.  On 7/18/19, he was following up after an EGD/Colonoscopy.  He continued on B12 daily.  He was having a formed BM 2x/day on Imodium 1 pill daily.  His colonoscopy was negative for microscopic colitis.  He had 1-2 alcohol beverages a month.  On 10/28/19, he said he d/c valacyclovir. He was now taking mirtazapine for depression. His BMs were formed 90% of the time. He had 1-2 BMs/day. He took Imodium 1 pill QD. He had been noting intermittent lower abdominal discomfort in the past month. He had it 3-4x/week and it lasted for about an hour. He described it as an urge to have a BM, but didn't. He denied cramping, bloating, and distention. He d/c omeprazole.   He had 1 beer/day. He would have 1-2 glasses of wine when guests were over. He typically had guests 2x/week. He said he did not have a good appetite. He was trying to eat more. Before his kidney transplant, he was 140 lbs. Afterwards, he dropped down to 130 lbs and said he couldn't manage to go over 135 lbs. He was not drinking Ensure or Boost. He said his depression had gotten worse, so he went to a psychiatrist who adjusted his mirtazapine dosage.   On 12/5/19, he said his creatinine had been elevated to around 1.9 (previously 1.5) and tomorrow he had a biopsy with a transplant team. He d/c the appeitite stimulant/megestrol 400 mg daily after taking for 2-3 weeks. His appetite had improved. He was eating a lot. He put on 5-6 lbs. He had only taken hyoscyamine 0.125 mg once for abdominal discomfort and it worked within 30 min. He still took Imodium 1 pill QD. He said it was effective. He only drank socially and had up to 2 glasses of wine.  On 6/17/21, he said his bowel habit was fine. He took Imodium 1 pill QD. He had 1-2 formed BMs/day. He had not been experiencing reflux symptoms or abdominal pain. His appetite was adequate. He was off vit B12. He continued on iron.  He had 2 alcohol drinks/night on the weekends and 1-2 drinks/night 1x during the week but was trying to hold on use during the week. He had COVID in Dec and lost 20 lbs during that time and was subsequently put on Marinol by his PCP. He had recent labwork with his PCP.  On 5/6/22, he said he was on medication for anxiety and depression which worked. He had COVID last year and lost 30 lbs as a result. He was put on Marinol to stimulate his appetite and gained 10 lbs, but it was difficult for him to keep his weight up. Dr. Joaquin ordered a CT today. He followed with a nutritionist - 1x/2 weeks - increasing caloric intake. He was not drinking any supplements, but had planned to start drinking Kefir. He was currently 138-139 lbs. He had taken Marinol for 2-3 weeks and his weight increased to 145 lbs.  He noted Marinol caused him to get lost in his own home so he would take it before bed.  He "vaguely" remembered taking Megace, but did not remember the benefit.   He would see Dr. Joaquin next month. Most recent labs were 2 weeks ago. Alcohol use was now only on weekends - 2 glasses of wine on Friday and 2 glasses on Saturday. Recent liver enzyme levels were normal he stated. He was not on B12 supplementation or omeprazole.  On 7/8/22, he said he had been on Megace 400 mg daily for about 5 weeks - some improvement. He worked with a nutritionist. He wanted to get back above 140 lbs. He had been having urgent BMs - now taking Imodium 1 pill BID which helped. After the colonoscopy, he would have a good BM earlier in the day followed by urgent BMs later. He had 4-5 BMs QD that is watery 1-2x/day.  HPI: Today, he says his bowel habit was unchanged following the bowel prep. His BMs start out as "strips" then become "balls" later in the day. His BM frequency bothers him. Before Miralax, he had urgent, liquidy stools. Currently, he is taking Miralax 0.75 cap BID. He continues on Megace and is eating more.   Labs 5/21/21 - COVID positive. CBC normal except hgb 12.8, .2. CMP normal except BUN 30, creatinine 1.46, GFR 47. Chol 252, , non . 7/22/19 - CMP normal except BUN 29, creatinine 1.48, sodium 134, GFR 47. CBC normal except hgb 9.8, .2. Ferritin 344.40, TIBC 246, iron sat 55%, UIBC 110, transferrin 198, vitamin B-12 1253. Folate normal. 5/10/19 Hgb 9.4, .2, B12 > 2000, Folate 10.

## 2022-07-21 ENCOUNTER — TELEPHONE ENCOUNTER (OUTPATIENT)
Dept: URBAN - METROPOLITAN AREA CLINIC 105 | Facility: CLINIC | Age: 77
End: 2022-07-21

## 2022-07-26 ENCOUNTER — APPOINTMENT (RX ONLY)
Dept: URBAN - METROPOLITAN AREA OTHER 1 | Facility: OTHER | Age: 77
Setting detail: DERMATOLOGY
End: 2022-07-26

## 2022-07-26 ENCOUNTER — RX ONLY (OUTPATIENT)
Age: 77
Setting detail: RX ONLY
End: 2022-07-26

## 2022-07-26 PROBLEM — C44.42 SQUAMOUS CELL CARCINOMA OF SKIN OF SCALP AND NECK: Status: ACTIVE | Noted: 2022-07-26

## 2022-07-26 PROCEDURE — ? MOHS SURGERY

## 2022-07-26 PROCEDURE — 17311 MOHS 1 STAGE H/N/HF/G: CPT

## 2022-07-26 RX ORDER — MUPIROCIN 20 MG/G
OINTMENT TOPICAL
Qty: 22 | Refills: 6 | Status: ERX | COMMUNITY
Start: 2022-07-26

## 2022-07-26 NOTE — PROCEDURE: MOHS SURGERY
Body Location Override (Optional - Billing Will Still Be Based On Selected Body Map Location If Applicable): right frontal scalp
Mohs Case Number: 
Date Of Previous Biopsy (Optional): 6-16-22
Biopsy Photograph Reviewed: Yes
Referring Physician (Optional): Dr. Kyle
Consent Type: Consent 1 (Standard)
Eye Shield Used: No
Initial Size Of Lesion: 1.2
X Size Of Lesion In Cm (Optional): 1
Primary Defect Length In Cm (Final Defect Size - Required For Flaps/Grafts): 2.4
Primary Defect Width In Cm (Final Defect Size - Required For Flaps/Grafts): 2
Repair Type: No repair - secondary intention
Oculoplastic Surgeon Procedure Text (A): After obtaining clear surgical margins the patient was sent to oculoplastics for surgical repair.  The patient understands they will receive post-surgical care and follow-up from the referring physician's office.
Otolaryngologist Procedure Text (A): After obtaining clear surgical margins the patient was sent to otolaryngology for surgical repair.  The patient understands they will receive post-surgical care and follow-up from the referring physician's office.
Plastic Surgeon Procedure Text (A): After obtaining clear surgical margins the patient was sent to plastics for surgical repair.  The patient understands they will receive post-surgical care and follow-up from the referring physician's office.
Mid-Level Procedure Text (A): After obtaining clear surgical margins the patient was sent to a mid-level provider for surgical repair.  The patient understands they will receive post-surgical care and follow-up from the mid-level provider.
Provider Procedure Text (A): After obtaining clear surgical margins the defect was repaired by another provider.
Asc Procedure Text (A): After obtaining clear surgical margins the patient was sent to an ASC for surgical repair.  The patient understands they will receive post-surgical care and follow-up from the ASC physician.
Simple / Intermediate / Complex Repair - Final Wound Length In Cm: 0
Suturegard Retention Suture: 2-0 Nylon
Retention Suture Bite Size: 3 mm
Length To Time In Minutes Device Was In Place: 10
Undermining Type: Entire Wound
Debridement Text: The wound edges were debrided prior to proceeding with the closure to facilitate wound healing.
Helical Rim Text: The closure involved the helical rim.
Vermilion Border Text: The closure involved the vermilion border.
Nostril Rim Text: The closure involved the nostril rim.
Retention Suture Text: Retention sutures were placed to support the closure and prevent dehiscence.
Location Indication Override (Is Already Calculated Based On Selected Body Location): Area M
Area H Indication Text: Tumors in this location are included in Area H (eyelids, eyebrows, nose, lips, chin, ear, pre-auricular, post-auricular, temple, genitalia, hands, feet, ankles and areola).  Tissue conservation is critical in these anatomic locations.
Area M Indication Text: Tumors in this location are included in Area M (cheek, forehead, scalp, neck, jawline and pretibial skin).  Mohs surgery is indicated for tumors in these anatomic locations.
Area L Indication Text: Tumors in this location are included in Area L (trunk and extremities).  Mohs surgery is indicated for larger tumors, or tumors with aggressive histologic features, in these anatomic locations.
Depth Of Tumor Invasion (For Histology): tumor not visualized (deep and peripheral margins are clear of tumor)
Perineural Invasion (For Histology - Be Specific If Possible): absent
Special Stains Stage 1 - Results: Base On Clearance Noted Above
Stage 2: Additional Anesthesia Type: 1% lidocaine with epinephrine
Staging Info: By selecting yes to the question above you will include information on AJCC 8 tumor staging in your Mohs note. Information on tumor staging will be automatically added for SCCs on the head and neck. AJCC 8 includes tumor size, tumor depth, perineural involvement and bone invasion.
Tumor Depth: Less than 6mm from granular layer and no invasion beyond the subcutaneous fat
Was The Patient On Physician Recommended Anticoagulation Therapy?: Please Select the Appropriate Response
Medical Necessity Statement: Based on my medical judgement, Mohs surgery is the most appropriate treatment for this cancer compared to other treatments.
Alternatives Discussed Intro (Do Not Add Period): I discussed alternative treatments to Mohs surgery and specifically discussed the risks and benefits of
Consent 1/Introductory Paragraph: The rationale for Mohs was explained to the patient and consent was obtained. The risks, benefits and alternatives to therapy were discussed in detail. Specifically, the risks of infection, scarring, bleeding, prolonged wound healing, incomplete removal, allergy to anesthesia, nerve injury and recurrence were addressed. Prior to the procedure, the treatment site was clearly identified and confirmed by the patient. All components of Universal Protocol/PAUSE Rule completed.
Consent 2/Introductory Paragraph: Mohs surgery was explained to the patient and consent was obtained. The risks, benefits and alternatives to therapy were discussed in detail. Specifically, the risks of infection, scarring, bleeding, prolonged wound healing, incomplete removal, allergy to anesthesia, nerve injury and recurrence were addressed. Prior to the procedure, the treatment site was clearly identified and confirmed by the patient. All components of Universal Protocol/PAUSE Rule completed.
Consent 3/Introductory Paragraph: I gave the patient a chance to ask questions they had about the procedure.  Following this I explained the Mohs procedure and consent was obtained. The risks, benefits and alternatives to therapy were discussed in detail. Specifically, the risks of infection, scarring, bleeding, prolonged wound healing, incomplete removal, allergy to anesthesia, nerve injury and recurrence were addressed. Prior to the procedure, the treatment site was clearly identified and confirmed by the patient. All components of Universal Protocol/PAUSE Rule completed.
Consent (Temporal Branch)/Introductory Paragraph: The rationale for Mohs was explained to the patient and consent was obtained. The risks, benefits and alternatives to therapy were discussed in detail. Specifically, the risks of damage to the temporal branch of the facial nerve, infection, scarring, bleeding, prolonged wound healing, incomplete removal, allergy to anesthesia, and recurrence were addressed. Prior to the procedure, the treatment site was clearly identified and confirmed by the patient. All components of Universal Protocol/PAUSE Rule completed.
Consent (Marginal Mandibular)/Introductory Paragraph: The rationale for Mohs was explained to the patient and consent was obtained. The risks, benefits and alternatives to therapy were discussed in detail. Specifically, the risks of damage to the marginal mandibular branch of the facial nerve, infection, scarring, bleeding, prolonged wound healing, incomplete removal, allergy to anesthesia, and recurrence were addressed. Prior to the procedure, the treatment site was clearly identified and confirmed by the patient. All components of Universal Protocol/PAUSE Rule completed.
Consent (Spinal Accessory)/Introductory Paragraph: The rationale for Mohs was explained to the patient and consent was obtained. The risks, benefits and alternatives to therapy were discussed in detail. Specifically, the risks of damage to the spinal accessory nerve, infection, scarring, bleeding, prolonged wound healing, incomplete removal, allergy to anesthesia, and recurrence were addressed. Prior to the procedure, the treatment site was clearly identified and confirmed by the patient. All components of Universal Protocol/PAUSE Rule completed.
Consent (Near Eyelid Margin)/Introductory Paragraph: The rationale for Mohs was explained to the patient and consent was obtained. The risks, benefits and alternatives to therapy were discussed in detail. Specifically, the risks of ectropion or eyelid deformity, infection, scarring, bleeding, prolonged wound healing, incomplete removal, allergy to anesthesia, nerve injury and recurrence were addressed. Prior to the procedure, the treatment site was clearly identified and confirmed by the patient. All components of Universal Protocol/PAUSE Rule completed.
Consent (Ear)/Introductory Paragraph: The rationale for Mohs was explained to the patient and consent was obtained. The risks, benefits and alternatives to therapy were discussed in detail. Specifically, the risks of ear deformity, infection, scarring, bleeding, prolonged wound healing, incomplete removal, allergy to anesthesia, nerve injury and recurrence were addressed. Prior to the procedure, the treatment site was clearly identified and confirmed by the patient. All components of Universal Protocol/PAUSE Rule completed.
Consent (Nose)/Introductory Paragraph: The rationale for Mohs was explained to the patient and consent was obtained. The risks, benefits and alternatives to therapy were discussed in detail. Specifically, the risks of nasal deformity, changes in the flow of air through the nose, infection, scarring, bleeding, prolonged wound healing, incomplete removal, allergy to anesthesia, nerve injury and recurrence were addressed. Prior to the procedure, the treatment site was clearly identified and confirmed by the patient. All components of Universal Protocol/PAUSE Rule completed.
Consent (Lip)/Introductory Paragraph: The rationale for Mohs was explained to the patient and consent was obtained. The risks, benefits and alternatives to therapy were discussed in detail. Specifically, the risks of lip deformity, changes in the oral aperture, infection, scarring, bleeding, prolonged wound healing, incomplete removal, allergy to anesthesia, nerve injury and recurrence were addressed. Prior to the procedure, the treatment site was clearly identified and confirmed by the patient. All components of Universal Protocol/PAUSE Rule completed.
Consent (Scalp)/Introductory Paragraph: The rationale for Mohs was explained to the patient and consent was obtained. The risks, benefits and alternatives to therapy were discussed in detail. Specifically, the risks of changes in hair growth pattern secondary to repair, infection, scarring, bleeding, prolonged wound healing, incomplete removal, allergy to anesthesia, nerve injury and recurrence were addressed. Prior to the procedure, the treatment site was clearly identified and confirmed by the patient. All components of Universal Protocol/PAUSE Rule completed.
Detail Level: Detailed
Postop Diagnosis: same
Surgeon: Dr. Beaulieu
Anesthesia Volume In Cc: 5
Hemostasis: Electrocautery
Estimated Blood Loss (Cc): minimal
Anesthesia Volume In Cc: 6
Brow Lift Text: A midfrontal incision was made medially to the defect to allow access to the tissues just superior to the left eyebrow. Following careful dissection inferiorly in a supraperiosteal plane to the level of the left eyebrow, several 3-0 monocryl sutures were used to resuspend the eyebrow orbicularis oculi muscular unit to the superior frontal bone periosteum. This resulted in an appropriate reapproximation of static eyebrow symmetry and correction of the left brow ptosis.
Suturegard Intro: Intraoperative tissue expansion was performed, utilizing the SUTUREGARD device, in order to reduce wound tension.
Suturegard Body: The suture ends were repeatedly re-tightened and re-clamped to achieve the desired tissue expansion.
Hemigard Intro: Due to skin fragility and wound tension, it was decided to use HEMIGARD adhesive retention suture devices to permit a linear closure. The skin was cleaned and dried for a 6cm distance away from the wound. Excessive hair, if present, was removed to allow for adhesion.
Hemigard Postcare Instructions: The HEMIGARD strips are to remain completely dry for at least 5-7 days.
Donor Site Anesthesia Type: same as repair anesthesia
Epidermal Closure Graft Donor Site (Optional): simple interrupted
Graft Donor Site Bandage (Optional-Leave Blank If You Don't Want In Note): Steri-strips and a pressure bandage were applied to the donor site.
Closure 2 Information: This tab is for additional flaps and grafts, including complex repair and grafts and complex repair and flaps. You can also specify a different location for the additional defect, if the location is the same you do not need to select a new one. We will insert the automated text for the repair you select below just as we do for solitary flaps and grafts. Please note that at this time if you select a location with a different insurance zone you will need to override the ICD10 and CPT if appropriate.
Closure 3 Information: This tab is for additional flaps and grafts above and beyond our usual structured repairs.  Please note if you enter information here it will not currently bill and you will need to add the billing information manually.
Wound Care: Mupirocin
Dressing: dry sterile dressing
Wound Care (No Sutures): Petrolatum
Wound Check: 14 days
Unna Boot Text: An Unna boot was placed to help immobilize the limb and facilitate more rapid healing.
Home Suture Removal Text: Patient was provided instructions on removing sutures and will remove their sutures at home.  If they have any questions or difficulties they will call the office.
Post-Care Instructions: I reviewed with the patient in detail post-care instructions. Patient is not to engage in any heavy lifting, exercise, or swimming for the next 14 days. Should the patient develop any fevers, chills, bleeding, severe pain patient will contact the office immediately.
Pain Refusal Text: I offered to prescribe pain medication but the patient refused to take this medication.
Mauc Instructions: By selecting yes to the question below the MAUC number will be added into the note.  This will be calculated automatically based on the diagnosis chosen, the size entered, the body zone selected (H,M,L) and the specific indications you chose. You will also have the option to override the Mohs AUC if you disagree with the automatically calculated number and this option is found in the Case Summary tab.
Where Do You Want The Question To Include Opioid Counseling Located?: Case Summary Tab
Eye Protection Verbiage: Before proceeding with the stage, a plastic scleral shield was inserted. The globe was anesthetized with a few drops of 1% lidocaine with 1:100,000 epinephrine. Then, an appropriate sized scleral shield was chosen and coated with lacrilube ointment. The shield was gently inserted and left in place for the duration of each stage. After the stage was completed, the shield was gently removed.
Mohs Method Verbiage: An incision at a 45 degree angle following the standard Mohs approach was done and the specimen was harvested as a microscopic controlled layer.
Surgeon/Pathologist Verbiage (Will Incorporate Name Of Surgeon From Intro If Not Blank): operated in two distinct and integrated capacities as the surgeon and pathologist.
Mohs Histo Method Verbiage: Each section was then chromacoded and processed in the Mohs lab using the Mohs protocol and submitted for frozen section.
Subsequent Stages Histo Method Verbiage: Using a similar technique to that described above, a thin layer of tissue was removed from all areas where tumor was visible on the previous stage.  The tissue was again oriented, mapped, dyed, and processed as above.
Mohs Rapid Report Verbiage: The area of clinically evident tumor was marked with skin marking ink and appropriately hatched.  The initial incision was made following the Mohs approach through the skin.  The specimen was taken to the lab, divided into the necessary number of pieces, chromacoded and processed according to the Mohs protocol.  This was repeated in successive stages until a tumor free defect was achieved.
Complex Repair Preamble Text (Leave Blank If You Do Not Want): Extensive wide undermining was performed.
Intermediate Repair Preamble Text (Leave Blank If You Do Not Want): Undermining was performed with blunt dissection.
Non-Graft Cartilage Fenestration Text: The cartilage was fenestrated with a 2mm punch biopsy to help facilitate healing.
Graft Cartilage Fenestration Text: The cartilage was fenestrated with a 2mm punch biopsy to help facilitate graft survival and healing.
Secondary Intention Text (Leave Blank If You Do Not Want): The defect will heal with secondary intention.
No Repair - Repaired With Adjacent Surgical Defect Text (Leave Blank If You Do Not Want): After obtaining clear surgical margins the defect was repaired concurrently with another surgical defect which was in close approximation.
Adjacent Tissue Transfer Text: The defect edges were debeveled with a #15 scalpel blade.  Given the location of the defect and the proximity to free margins an adjacent tissue transfer was deemed most appropriate.  Using a sterile surgical marker, an appropriate flap was drawn incorporating the defect and placing the expected incisions within the relaxed skin tension lines where possible.    The area thus outlined was incised deep to adipose tissue with a #15 scalpel blade.  The skin margins were undermined to an appropriate distance in all directions utilizing iris scissors.
Advancement Flap (Single) Text: The defect edges were debeveled with a #15 scalpel blade.  Given the location of the defect and the proximity to free margins a single advancement flap was deemed most appropriate.  Using a sterile surgical marker, an appropriate advancement flap was drawn incorporating the defect and placing the expected incisions within the relaxed skin tension lines where possible.    The area thus outlined was incised deep to adipose tissue with a #15 scalpel blade.  The skin margins were undermined to an appropriate distance in all directions utilizing iris scissors.
Advancement Flap (Double) Text: The defect edges were debeveled with a #15 scalpel blade.  Given the location of the defect and the proximity to free margins a double advancement flap was deemed most appropriate.  Using a sterile surgical marker, the appropriate advancement flaps were drawn incorporating the defect and placing the expected incisions within the relaxed skin tension lines where possible.    The area thus outlined was incised deep to adipose tissue with a #15 scalpel blade.  The skin margins were undermined to an appropriate distance in all directions utilizing iris scissors.
Burow's Advancement Flap Text: The defect edges were debeveled with a #15 scalpel blade.  Given the location of the defect and the proximity to free margins a Burow's advancement flap was deemed most appropriate.  Using a sterile surgical marker, the appropriate advancement flap was drawn incorporating the defect and placing the expected incisions within the relaxed skin tension lines where possible.    The area thus outlined was incised deep to adipose tissue with a #15 scalpel blade.  The skin margins were undermined to an appropriate distance in all directions utilizing iris scissors.
Chonodrocutaneous Helical Advancement Flap Text: The defect edges were debeveled with a #15 scalpel blade.  Given the location of the defect and the proximity to free margins a chondrocutaneous helical advancement flap was deemed most appropriate.  Using a sterile surgical marker, the appropriate advancement flap was drawn incorporating the defect and placing the expected incisions within the relaxed skin tension lines where possible.    The area thus outlined was incised deep to adipose tissue with a #15 scalpel blade.  The skin margins were undermined to an appropriate distance in all directions utilizing iris scissors.
Crescentic Advancement Flap Text: The defect edges were debeveled with a #15 scalpel blade.  Given the location of the defect and the proximity to free margins a crescentic advancement flap was deemed most appropriate.  Using a sterile surgical marker, the appropriate advancement flap was drawn incorporating the defect and placing the expected incisions within the relaxed skin tension lines where possible.    The area thus outlined was incised deep to adipose tissue with a #15 scalpel blade.  The skin margins were undermined to an appropriate distance in all directions utilizing iris scissors.
A-T Advancement Flap Text: The defect edges were debeveled with a #15 scalpel blade.  Given the location of the defect, shape of the defect and the proximity to free margins an A-T advancement flap was deemed most appropriate.  Using a sterile surgical marker, an appropriate advancement flap was drawn incorporating the defect and placing the expected incisions within the relaxed skin tension lines where possible.    The area thus outlined was incised deep to adipose tissue with a #15 scalpel blade.  The skin margins were undermined to an appropriate distance in all directions utilizing iris scissors.
O-T Advancement Flap Text: The defect edges were debeveled with a #15 scalpel blade.  Given the location of the defect, shape of the defect and the proximity to free margins an O-T advancement flap was deemed most appropriate.  Using a sterile surgical marker, an appropriate advancement flap was drawn incorporating the defect and placing the expected incisions within the relaxed skin tension lines where possible.    The area thus outlined was incised deep to adipose tissue with a #15 scalpel blade.  The skin margins were undermined to an appropriate distance in all directions utilizing iris scissors.
O-L Flap Text: The defect edges were debeveled with a #15 scalpel blade.  Given the location of the defect, shape of the defect and the proximity to free margins an O-L flap was deemed most appropriate.  Using a sterile surgical marker, an appropriate advancement flap was drawn incorporating the defect and placing the expected incisions within the relaxed skin tension lines where possible.    The area thus outlined was incised deep to adipose tissue with a #15 scalpel blade.  The skin margins were undermined to an appropriate distance in all directions utilizing iris scissors.
O-Z Flap Text: The defect edges were debeveled with a #15 scalpel blade.  Given the location of the defect, shape of the defect and the proximity to free margins an O-Z flap was deemed most appropriate.  Using a sterile surgical marker, an appropriate transposition flap was drawn incorporating the defect and placing the expected incisions within the relaxed skin tension lines where possible. The area thus outlined was incised deep to adipose tissue with a #15 scalpel blade.  The skin margins were undermined to an appropriate distance in all directions utilizing iris scissors.
Double O-Z Flap Text: The defect edges were debeveled with a #15 scalpel blade.  Given the location of the defect, shape of the defect and the proximity to free margins a Double O-Z flap was deemed most appropriate.  Using a sterile surgical marker, an appropriate transposition flap was drawn incorporating the defect and placing the expected incisions within the relaxed skin tension lines where possible. The area thus outlined was incised deep to adipose tissue with a #15 scalpel blade.  The skin margins were undermined to an appropriate distance in all directions utilizing iris scissors.
V-Y Flap Text: The defect edges were debeveled with a #15 scalpel blade.  Given the location of the defect, shape of the defect and the proximity to free margins a V-Y flap was deemed most appropriate.  Using a sterile surgical marker, an appropriate advancement flap was drawn incorporating the defect and placing the expected incisions within the relaxed skin tension lines where possible.    The area thus outlined was incised deep to adipose tissue with a #15 scalpel blade.  The skin margins were undermined to an appropriate distance in all directions utilizing iris scissors.
Advancement-Rotation Flap Text: The defect edges were debeveled with a #15 scalpel blade.  Given the location of the defect, shape of the defect and the proximity to free margins an advancement-rotation flap was deemed most appropriate.  Using a sterile surgical marker, an appropriate flap was drawn incorporating the defect and placing the expected incisions within the relaxed skin tension lines where possible. The area thus outlined was incised deep to adipose tissue with a #15 scalpel blade.  The skin margins were undermined to an appropriate distance in all directions utilizing iris scissors.
Mercedes Flap Text: The defect edges were debeveled with a #15 scalpel blade.  Given the location of the defect, shape of the defect and the proximity to free margins a Mercedes flap was deemed most appropriate.  Using a sterile surgical marker, an appropriate advancement flap was drawn incorporating the defect and placing the expected incisions within the relaxed skin tension lines where possible. The area thus outlined was incised deep to adipose tissue with a #15 scalpel blade.  The skin margins were undermined to an appropriate distance in all directions utilizing iris scissors.
Modified Advancement Flap Text: The defect edges were debeveled with a #15 scalpel blade.  Given the location of the defect, shape of the defect and the proximity to free margins a modified advancement flap was deemed most appropriate.  Using a sterile surgical marker, an appropriate advancement flap was drawn incorporating the defect and placing the expected incisions within the relaxed skin tension lines where possible.    The area thus outlined was incised deep to adipose tissue with a #15 scalpel blade.  The skin margins were undermined to an appropriate distance in all directions utilizing iris scissors.
Mucosal Advancement Flap Text: Given the location of the defect, shape of the defect and the proximity to free margins a mucosal advancement flap was deemed most appropriate. Incisions were made with a 15 blade scalpel in the appropriate fashion along the cutaneous vermilion border and the mucosal lip. The remaining actinically damaged mucosal tissue was excised.  The mucosal advancement flap was then elevated to the gingival sulcus with care taken to preserve the neurovascular structures and advanced into the primary defect. Care was taken to ensure that precise realignment of the vermilion border was achieved.
Peng Advancement Flap Text: The defect edges were debeveled with a #15 scalpel blade.  Given the location of the defect, shape of the defect and the proximity to free margins a Peng advancement flap was deemed most appropriate.  Using a sterile surgical marker, an appropriate advancement flap was drawn incorporating the defect and placing the expected incisions within the relaxed skin tension lines where possible. The area thus outlined was incised deep to adipose tissue with a #15 scalpel blade.  The skin margins were undermined to an appropriate distance in all directions utilizing iris scissors.
Hatchet Flap Text: The defect edges were debeveled with a #15 scalpel blade.  Given the location of the defect, shape of the defect and the proximity to free margins a hatchet flap was deemed most appropriate.  Using a sterile surgical marker, an appropriate hatchet flap was drawn incorporating the defect and placing the expected incisions within the relaxed skin tension lines where possible.    The area thus outlined was incised deep to adipose tissue with a #15 scalpel blade.  The skin margins were undermined to an appropriate distance in all directions utilizing iris scissors.
Rotation Flap Text: The defect edges were debeveled with a #15 scalpel blade.  Given the location of the defect, shape of the defect and the proximity to free margins a rotation flap was deemed most appropriate.  Using a sterile surgical marker, an appropriate rotation flap was drawn incorporating the defect and placing the expected incisions within the relaxed skin tension lines where possible.    The area thus outlined was incised deep to adipose tissue with a #15 scalpel blade.  The skin margins were undermined to an appropriate distance in all directions utilizing iris scissors.
Spiral Flap Text: The defect edges were debeveled with a #15 scalpel blade.  Given the location of the defect, shape of the defect and the proximity to free margins a spiral flap was deemed most appropriate.  Using a sterile surgical marker, an appropriate rotation flap was drawn incorporating the defect and placing the expected incisions within the relaxed skin tension lines where possible. The area thus outlined was incised deep to adipose tissue with a #15 scalpel blade.  The skin margins were undermined to an appropriate distance in all directions utilizing iris scissors.
Staged Advancement Flap Text: The defect edges were debeveled with a #15 scalpel blade.  Given the location of the defect, shape of the defect and the proximity to free margins a staged advancement flap was deemed most appropriate.  Using a sterile surgical marker, an appropriate advancement flap was drawn incorporating the defect and placing the expected incisions within the relaxed skin tension lines where possible. The area thus outlined was incised deep to adipose tissue with a #15 scalpel blade.  The skin margins were undermined to an appropriate distance in all directions utilizing iris scissors.
Star Wedge Flap Text: The defect edges were debeveled with a #15 scalpel blade.  Given the location of the defect, shape of the defect and the proximity to free margins a star wedge flap was deemed most appropriate.  Using a sterile surgical marker, an appropriate rotation flap was drawn incorporating the defect and placing the expected incisions within the relaxed skin tension lines where possible. The area thus outlined was incised deep to adipose tissue with a #15 scalpel blade.  The skin margins were undermined to an appropriate distance in all directions utilizing iris scissors.
Transposition Flap Text: The defect edges were debeveled with a #15 scalpel blade.  Given the location of the defect and the proximity to free margins a transposition flap was deemed most appropriate.  Using a sterile surgical marker, an appropriate transposition flap was drawn incorporating the defect.    The area thus outlined was incised deep to adipose tissue with a #15 scalpel blade.  The skin margins were undermined to an appropriate distance in all directions utilizing iris scissors.
Muscle Hinge Flap Text: The defect edges were debeveled with a #15 scalpel blade.  Given the size, depth and location of the defect and the proximity to free margins a muscle hinge flap was deemed most appropriate.  Using a sterile surgical marker, an appropriate hinge flap was drawn incorporating the defect. The area thus outlined was incised with a #15 scalpel blade.  The skin margins were undermined to an appropriate distance in all directions utilizing iris scissors.
Mustarde Flap Text: The defect edges were debeveled with a #15 scalpel blade.  Given the size, depth and location of the defect and the proximity to free margins a Mustarde flap was deemed most appropriate.  Using a sterile surgical marker, an appropriate flap was drawn incorporating the defect. The area thus outlined was incised with a #15 scalpel blade.  The skin margins were undermined to an appropriate distance in all directions utilizing iris scissors.
Nasal Turnover Hinge Flap Text: The defect edges were debeveled with a #15 scalpel blade.  Given the size, depth, location of the defect and the defect being full thickness a nasal turnover hinge flap was deemed most appropriate.  Using a sterile surgical marker, an appropriate hinge flap was drawn incorporating the defect. The area thus outlined was incised with a #15 scalpel blade. The flap was designed to recreate the nasal mucosal lining and the alar rim. The skin margins were undermined to an appropriate distance in all directions utilizing iris scissors.
Nasalis-Muscle-Based Myocutaneous Island Pedicle Flap Text: Using a #15 blade, an incision was made around the donor flap to the level of the nasalis muscle. Wide lateral undermining was then performed in both the subcutaneous plane above the nasalis muscle, and in a submuscular plane just above periosteum. This allowed the formation of a free nasalis muscle axial pedicle (based on the angular artery) which was still attached to the actual cutaneous flap, increasing its mobility and vascular viability. Hemostasis was obtained with pinpoint electrocoagulation. The flap was mobilized into position and the pivotal anchor points positioned and stabilized with buried interrupted sutures. Subcutaneous and dermal tissues were closed in a multilayered fashion with sutures. Tissue redundancies were excised, and the epidermal edges were apposed without significant tension and sutured with sutures.
Orbicularis Oris Muscle Flap Text: The defect edges were debeveled with a #15 scalpel blade.  Given that the defect affected the competency of the oral sphincter an orbicularis oris muscle flap was deemed most appropriate to restore this competency and normal muscle function.  Using a sterile surgical marker, an appropriate flap was drawn incorporating the defect. The area thus outlined was incised with a #15 scalpel blade.
Melolabial Transposition Flap Text: The defect edges were debeveled with a #15 scalpel blade.  Given the location of the defect and the proximity to free margins a melolabial flap was deemed most appropriate.  Using a sterile surgical marker, an appropriate melolabial transposition flap was drawn incorporating the defect.    The area thus outlined was incised deep to adipose tissue with a #15 scalpel blade.  The skin margins were undermined to an appropriate distance in all directions utilizing iris scissors.
Rhombic Flap Text: The defect edges were debeveled with a #15 scalpel blade.  Given the location of the defect and the proximity to free margins a rhombic flap was deemed most appropriate.  Using a sterile surgical marker, an appropriate rhombic flap was drawn incorporating the defect.    The area thus outlined was incised deep to adipose tissue with a #15 scalpel blade.  The skin margins were undermined to an appropriate distance in all directions utilizing iris scissors.
Rhomboid Transposition Flap Text: The defect edges were debeveled with a #15 scalpel blade.  Given the location of the defect and the proximity to free margins a rhomboid transposition flap was deemed most appropriate.  Using a sterile surgical marker, an appropriate rhomboid flap was drawn incorporating the defect.    The area thus outlined was incised deep to adipose tissue with a #15 scalpel blade.  The skin margins were undermined to an appropriate distance in all directions utilizing iris scissors.
Bi-Rhombic Flap Text: The defect edges were debeveled with a #15 scalpel blade.  Given the location of the defect and the proximity to free margins a bi-rhombic flap was deemed most appropriate.  Using a sterile surgical marker, an appropriate rhombic flap was drawn incorporating the defect. The area thus outlined was incised deep to adipose tissue with a #15 scalpel blade.  The skin margins were undermined to an appropriate distance in all directions utilizing iris scissors.
Helical Rim Advancement Flap Text: The defect edges were debeveled with a #15 blade scalpel.  Given the location of the defect and the proximity to free margins (helical rim) a double helical rim advancement flap was deemed most appropriate.  Using a sterile surgical marker, the appropriate advancement flaps were drawn incorporating the defect and placing the expected incisions between the helical rim and antihelix where possible.  The area thus outlined was incised through and through with a #15 scalpel blade.  With a skin hook and iris scissors, the flaps were gently and sharply undermined and freed up.
Bilateral Helical Rim Advancement Flap Text: The defect edges were debeveled with a #15 blade scalpel.  Given the location of the defect and the proximity to free margins (helical rim) a bilateral helical rim advancement flap was deemed most appropriate.  Using a sterile surgical marker, the appropriate advancement flaps were drawn incorporating the defect and placing the expected incisions between the helical rim and antihelix where possible.  The area thus outlined was incised through and through with a #15 scalpel blade.  With a skin hook and iris scissors, the flaps were gently and sharply undermined and freed up.
Ear Star Wedge Flap Text: The defect edges were debeveled with a #15 blade scalpel.  Given the location of the defect and the proximity to free margins (helical rim) an ear star wedge flap was deemed most appropriate.  Using a sterile surgical marker, the appropriate flap was drawn incorporating the defect and placing the expected incisions between the helical rim and antihelix where possible.  The area thus outlined was incised through and through with a #15 scalpel blade.
Banner Transposition Flap Text: The defect edges were debeveled with a #15 scalpel blade.  Given the location of the defect and the proximity to free margins a Banner transposition flap was deemed most appropriate.  Using a sterile surgical marker, an appropriate flap drawn around the defect. The area thus outlined was incised deep to adipose tissue with a #15 scalpel blade.  The skin margins were undermined to an appropriate distance in all directions utilizing iris scissors.
Bilobed Flap Text: The defect edges were debeveled with a #15 scalpel blade.  Given the location of the defect and the proximity to free margins a bilobe flap was deemed most appropriate.  Using a sterile surgical marker, an appropriate bilobe flap drawn around the defect.    The area thus outlined was incised deep to adipose tissue with a #15 scalpel blade.  The skin margins were undermined to an appropriate distance in all directions utilizing iris scissors.
Bilobed Transposition Flap Text: The defect edges were debeveled with a #15 scalpel blade.  Given the location of the defect and the proximity to free margins a bilobed transposition flap was deemed most appropriate.  Using a sterile surgical marker, an appropriate bilobe flap drawn around the defect.    The area thus outlined was incised deep to adipose tissue with a #15 scalpel blade.  The skin margins were undermined to an appropriate distance in all directions utilizing iris scissors.
Trilobed Flap Text: The defect edges were debeveled with a #15 scalpel blade.  Given the location of the defect and the proximity to free margins a trilobed flap was deemed most appropriate.  Using a sterile surgical marker, an appropriate trilobed flap drawn around the defect.    The area thus outlined was incised deep to adipose tissue with a #15 scalpel blade.  The skin margins were undermined to an appropriate distance in all directions utilizing iris scissors.
Dorsal Nasal Flap Text: The defect edges were debeveled with a #15 scalpel blade.  Given the location of the defect and the proximity to free margins a dorsal nasal flap was deemed most appropriate.  Using a sterile surgical marker, an appropriate dorsal nasal flap was drawn around the defect.    The area thus outlined was incised deep to adipose tissue with a #15 scalpel blade.  The skin margins were undermined to an appropriate distance in all directions utilizing iris scissors.
Island Pedicle Flap Text: The defect edges were debeveled with a #15 scalpel blade.  Given the location of the defect, shape of the defect and the proximity to free margins an island pedicle advancement flap was deemed most appropriate.  Using a sterile surgical marker, an appropriate advancement flap was drawn incorporating the defect, outlining the appropriate donor tissue and placing the expected incisions within the relaxed skin tension lines where possible.    The area thus outlined was incised deep to adipose tissue with a #15 scalpel blade.  The skin margins were undermined to an appropriate distance in all directions around the primary defect and laterally outward around the island pedicle utilizing iris scissors.  There was minimal undermining beneath the pedicle flap.
Island Pedicle Flap With Canthal Suspension Text: The defect edges were debeveled with a #15 scalpel blade.  Given the location of the defect, shape of the defect and the proximity to free margins an island pedicle advancement flap was deemed most appropriate.  Using a sterile surgical marker, an appropriate advancement flap was drawn incorporating the defect, outlining the appropriate donor tissue and placing the expected incisions within the relaxed skin tension lines where possible. The area thus outlined was incised deep to adipose tissue with a #15 scalpel blade.  The skin margins were undermined to an appropriate distance in all directions around the primary defect and laterally outward around the island pedicle utilizing iris scissors.  There was minimal undermining beneath the pedicle flap. A suspension suture was placed in the canthal tendon to prevent tension and prevent ectropion.
Alar Island Pedicle Flap Text: The defect edges were debeveled with a #15 scalpel blade.  Given the location of the defect, shape of the defect and the proximity to the alar rim an island pedicle advancement flap was deemed most appropriate.  Using a sterile surgical marker, an appropriate advancement flap was drawn incorporating the defect, outlining the appropriate donor tissue and placing the expected incisions within the nasal ala running parallel to the alar rim. The area thus outlined was incised with a #15 scalpel blade.  The skin margins were undermined minimally to an appropriate distance in all directions around the primary defect and laterally outward around the island pedicle utilizing iris scissors.  There was minimal undermining beneath the pedicle flap.
Double Island Pedicle Flap Text: The defect edges were debeveled with a #15 scalpel blade.  Given the location of the defect, shape of the defect and the proximity to free margins a double island pedicle advancement flap was deemed most appropriate.  Using a sterile surgical marker, an appropriate advancement flap was drawn incorporating the defect, outlining the appropriate donor tissue and placing the expected incisions within the relaxed skin tension lines where possible.    The area thus outlined was incised deep to adipose tissue with a #15 scalpel blade.  The skin margins were undermined to an appropriate distance in all directions around the primary defect and laterally outward around the island pedicle utilizing iris scissors.  There was minimal undermining beneath the pedicle flap.
Island Pedicle Flap-Requiring Vessel Identification Text: The defect edges were debeveled with a #15 scalpel blade.  Given the location of the defect, shape of the defect and the proximity to free margins an island pedicle advancement flap was deemed most appropriate.  Using a sterile surgical marker, an appropriate advancement flap was drawn, based on the axial vessel mentioned above, incorporating the defect, outlining the appropriate donor tissue and placing the expected incisions within the relaxed skin tension lines where possible.    The area thus outlined was incised deep to adipose tissue with a #15 scalpel blade.  The skin margins were undermined to an appropriate distance in all directions around the primary defect and laterally outward around the island pedicle utilizing iris scissors.  There was minimal undermining beneath the pedicle flap.
Keystone Flap Text: The defect edges were debeveled with a #15 scalpel blade.  Given the location of the defect, shape of the defect a keystone flap was deemed most appropriate.  Using a sterile surgical marker, an appropriate keystone flap was drawn incorporating the defect, outlining the appropriate donor tissue and placing the expected incisions within the relaxed skin tension lines where possible. The area thus outlined was incised deep to adipose tissue with a #15 scalpel blade.  The skin margins were undermined to an appropriate distance in all directions around the primary defect and laterally outward around the flap utilizing iris scissors.
O-T Plasty Text: The defect edges were debeveled with a #15 scalpel blade.  Given the location of the defect, shape of the defect and the proximity to free margins an O-T plasty was deemed most appropriate.  Using a sterile surgical marker, an appropriate O-T plasty was drawn incorporating the defect and placing the expected incisions within the relaxed skin tension lines where possible.    The area thus outlined was incised deep to adipose tissue with a #15 scalpel blade.  The skin margins were undermined to an appropriate distance in all directions utilizing iris scissors.
O-Z Plasty Text: The defect edges were debeveled with a #15 scalpel blade.  Given the location of the defect, shape of the defect and the proximity to free margins an O-Z plasty (double transposition flap) was deemed most appropriate.  Using a sterile surgical marker, the appropriate transposition flaps were drawn incorporating the defect and placing the expected incisions within the relaxed skin tension lines where possible.    The area thus outlined was incised deep to adipose tissue with a #15 scalpel blade.  The skin margins were undermined to an appropriate distance in all directions utilizing iris scissors.  Hemostasis was achieved with electrocautery.  The flaps were then transposed into place, one clockwise and the other counterclockwise, and anchored with interrupted buried subcutaneous sutures.
Double O-Z Plasty Text: The defect edges were debeveled with a #15 scalpel blade.  Given the location of the defect, shape of the defect and the proximity to free margins a Double O-Z plasty (double transposition flap) was deemed most appropriate.  Using a sterile surgical marker, the appropriate transposition flaps were drawn incorporating the defect and placing the expected incisions within the relaxed skin tension lines where possible. The area thus outlined was incised deep to adipose tissue with a #15 scalpel blade.  The skin margins were undermined to an appropriate distance in all directions utilizing iris scissors.  Hemostasis was achieved with electrocautery.  The flaps were then transposed into place, one clockwise and the other counterclockwise, and anchored with interrupted buried subcutaneous sutures.
V-Y Plasty Text: The defect edges were debeveled with a #15 scalpel blade.  Given the location of the defect, shape of the defect and the proximity to free margins an V-Y advancement flap was deemed most appropriate.  Using a sterile surgical marker, an appropriate advancement flap was drawn incorporating the defect and placing the expected incisions within the relaxed skin tension lines where possible.    The area thus outlined was incised deep to adipose tissue with a #15 scalpel blade.  The skin margins were undermined to an appropriate distance in all directions utilizing iris scissors.
H Plasty Text: Given the location of the defect, shape of the defect and the proximity to free margins a H-plasty was deemed most appropriate for repair.  Using a sterile surgical marker, the appropriate advancement arms of the H-plasty were drawn incorporating the defect and placing the expected incisions within the relaxed skin tension lines where possible. The area thus outlined was incised deep to adipose tissue with a #15 scalpel blade. The skin margins were undermined to an appropriate distance in all directions utilizing iris scissors.  The opposing advancement arms were then advanced into place in opposite direction and anchored with interrupted buried subcutaneous sutures.
W Plasty Text: The lesion was extirpated to the level of the fat with a #15 scalpel blade.  Given the location of the defect, shape of the defect and the proximity to free margins a W-plasty was deemed most appropriate for repair.  Using a sterile surgical marker, the appropriate transposition arms of the W-plasty were drawn incorporating the defect and placing the expected incisions within the relaxed skin tension lines where possible.    The area thus outlined was incised deep to adipose tissue with a #15 scalpel blade.  The skin margins were undermined to an appropriate distance in all directions utilizing iris scissors.  The opposing transposition arms were then transposed into place in opposite direction and anchored with interrupted buried subcutaneous sutures.
Z Plasty Text: The lesion was extirpated to the level of the fat with a #15 scalpel blade.  Given the location of the defect, shape of the defect and the proximity to free margins a Z-plasty was deemed most appropriate for repair.  Using a sterile surgical marker, the appropriate transposition arms of the Z-plasty were drawn incorporating the defect and placing the expected incisions within the relaxed skin tension lines where possible.    The area thus outlined was incised deep to adipose tissue with a #15 scalpel blade.  The skin margins were undermined to an appropriate distance in all directions utilizing iris scissors.  The opposing transposition arms were then transposed into place in opposite direction and anchored with interrupted buried subcutaneous sutures.
Zygomaticofacial Flap Text: Given the location of the defect, shape of the defect and the proximity to free margins a zygomaticofacial flap was deemed most appropriate for repair.  Using a sterile surgical marker, the appropriate flap was drawn incorporating the defect and placing the expected incisions within the relaxed skin tension lines where possible. The area thus outlined was incised deep to adipose tissue with a #15 scalpel blade with preservation of a vascular pedicle.  The skin margins were undermined to an appropriate distance in all directions utilizing iris scissors.  The flap was then placed into the defect and anchored with interrupted buried subcutaneous sutures.
Cheek Interpolation Flap Text: A decision was made to reconstruct the defect utilizing an interpolation axial flap and a staged reconstruction.  A telfa template was made of the defect.  This telfa template was then used to outline the Cheek Interpolation flap.  The donor area for the pedicle flap was then injected with anesthesia.  The flap was excised through the skin and subcutaneous tissue down to the layer of the underlying musculature.  The interpolation flap was carefully excised within this deep plane to maintain its blood supply.  The edges of the donor site were undermined.   The donor site was closed in a primary fashion.  The pedicle was then rotated into position and sutured.  Once the tube was sutured into place, adequate blood supply was confirmed with blanching and refill.  The pedicle was then wrapped with xeroform gauze and dressed appropriately with a telfa and gauze bandage to ensure continued blood supply and protect the attached pedicle.
Cheek-To-Nose Interpolation Flap Text: A decision was made to reconstruct the defect utilizing an interpolation axial flap and a staged reconstruction.  A telfa template was made of the defect.  This telfa template was then used to outline the Cheek-To-Nose Interpolation flap.  The donor area for the pedicle flap was then injected with anesthesia.  The flap was excised through the skin and subcutaneous tissue down to the layer of the underlying musculature.  The interpolation flap was carefully excised within this deep plane to maintain its blood supply.  The edges of the donor site were undermined.   The donor site was closed in a primary fashion.  The pedicle was then rotated into position and sutured.  Once the tube was sutured into place, adequate blood supply was confirmed with blanching and refill.  The pedicle was then wrapped with xeroform gauze and dressed appropriately with a telfa and gauze bandage to ensure continued blood supply and protect the attached pedicle.
Interpolation Flap Text: A decision was made to reconstruct the defect utilizing an interpolation axial flap and a staged reconstruction.  A telfa template was made of the defect.  This telfa template was then used to outline the interpolation flap.  The donor area for the pedicle flap was then injected with anesthesia.  The flap was excised through the skin and subcutaneous tissue down to the layer of the underlying musculature.  The interpolation flap was carefully excised within this deep plane to maintain its blood supply.  The edges of the donor site were undermined.   The donor site was closed in a primary fashion.  The pedicle was then rotated into position and sutured.  Once the tube was sutured into place, adequate blood supply was confirmed with blanching and refill.  The pedicle was then wrapped with xeroform gauze and dressed appropriately with a telfa and gauze bandage to ensure continued blood supply and protect the attached pedicle.
Melolabial Interpolation Flap Text: A decision was made to reconstruct the defect utilizing an interpolation axial flap and a staged reconstruction.  A telfa template was made of the defect.  This telfa template was then used to outline the melolabial interpolation flap.  The donor area for the pedicle flap was then injected with anesthesia.  The flap was excised through the skin and subcutaneous tissue down to the layer of the underlying musculature.  The pedicle flap was carefully excised within this deep plane to maintain its blood supply.  The edges of the donor site were undermined.   The donor site was closed in a primary fashion.  The pedicle was then rotated into position and sutured.  Once the tube was sutured into place, adequate blood supply was confirmed with blanching and refill.  The pedicle was then wrapped with xeroform gauze and dressed appropriately with a telfa and gauze bandage to ensure continued blood supply and protect the attached pedicle.
Mastoid Interpolation Flap Text: A decision was made to reconstruct the defect utilizing an interpolation axial flap and a staged reconstruction.  A telfa template was made of the defect.  This telfa template was then used to outline the mastoid interpolation flap.  The donor area for the pedicle flap was then injected with anesthesia.  The flap was excised through the skin and subcutaneous tissue down to the layer of the underlying musculature.  The pedicle flap was carefully excised within this deep plane to maintain its blood supply.  The edges of the donor site were undermined.   The donor site was closed in a primary fashion.  The pedicle was then rotated into position and sutured.  Once the tube was sutured into place, adequate blood supply was confirmed with blanching and refill.  The pedicle was then wrapped with xeroform gauze and dressed appropriately with a telfa and gauze bandage to ensure continued blood supply and protect the attached pedicle.
Posterior Auricular Interpolation Flap Text: A decision was made to reconstruct the defect utilizing an interpolation axial flap and a staged reconstruction.  A telfa template was made of the defect.  This telfa template was then used to outline the posterior auricular interpolation flap.  The donor area for the pedicle flap was then injected with anesthesia.  The flap was excised through the skin and subcutaneous tissue down to the layer of the underlying musculature.  The pedicle flap was carefully excised within this deep plane to maintain its blood supply.  The edges of the donor site were undermined.   The donor site was closed in a primary fashion.  The pedicle was then rotated into position and sutured.  Once the tube was sutured into place, adequate blood supply was confirmed with blanching and refill.  The pedicle was then wrapped with xeroform gauze and dressed appropriately with a telfa and gauze bandage to ensure continued blood supply and protect the attached pedicle.
Paramedian Forehead Flap Text: A decision was made to reconstruct the defect utilizing an interpolation axial flap and a staged reconstruction.  A telfa template was made of the defect.  This telfa template was then used to outline the paramedian forehead pedicle flap.  The donor area for the pedicle flap was then injected with anesthesia.  The flap was excised through the skin and subcutaneous tissue down to the layer of the underlying musculature.  The pedicle flap was carefully excised within this deep plane to maintain its blood supply.  The edges of the donor site were undermined.   The donor site was closed in a primary fashion.  The pedicle was then rotated into position and sutured.  Once the tube was sutured into place, adequate blood supply was confirmed with blanching and refill.  The pedicle was then wrapped with xeroform gauze and dressed appropriately with a telfa and gauze bandage to ensure continued blood supply and protect the attached pedicle.
Abbe Flap (Upper To Lower Lip) Text: The defect of the lower lip was assessed and measured.  Given the location and size of the defect, an Abbe flap was deemed most appropriate.  Using a sterile surgical marker, an appropriate Abbe flap was measured and drawn on the upper lip. Local anesthesia was then infiltrated.  A scalpel was then used to incise the upper lip through and through the skin, vermilion, muscle and mucosa, leaving the flap pedicled on the opposite side.  The flap was then rotated and transferred to the lower lip defect.  The flap was then sutured into place with a three layer technique, closing the orbicularis oris muscle layer with subcutaneous buried sutures, followed by a mucosal layer and an epidermal layer.
Abbe Flap (Lower To Upper Lip) Text: The defect of the upper lip was assessed and measured.  Given the location and size of the defect, an Abbe flap was deemed most appropriate.  Using a sterile surgical marker, an appropriate Abbe flap was measured and drawn on the lower lip. Local anesthesia was then infiltrated. A scalpel was then used to incise the upper lip through and through the skin, vermilion, muscle and mucosa, leaving the flap pedicled on the opposite side.  The flap was then rotated and transferred to the lower lip defect.  The flap was then sutured into place with a three layer technique, closing the orbicularis oris muscle layer with subcutaneous buried sutures, followed by a mucosal layer and an epidermal layer.
Estlander Flap (Upper To Lower Lip) Text: The defect of the lower lip was assessed and measured.  Given the location and size of the defect, an Estlander flap was deemed most appropriate.  Using a sterile surgical marker, an appropriate Estlander flap was measured and drawn on the upper lip. Local anesthesia was then infiltrated. A scalpel was then used to incise the lateral aspect of the flap, through skin, muscle and mucosa, leaving the flap pedicled medially.  The flap was then rotated and positioned to fill the lower lip defect.  The flap was then sutured into place with a three layer technique, closing the orbicularis oris muscle layer with subcutaneous buried sutures, followed by a mucosal layer and an epidermal layer.
Cheiloplasty (Less Than 50%) Text: A decision was made to reconstruct the defect with a  cheiloplasty.  The defect was undermined extensively.  Additional orbicularis oris muscle was excised with a 15 blade scalpel.  The defect was converted into a full thickness wedge, of less than 50% of the vertical height of the lip, to facilite a better cosmetic result.  Small vessels were then tied off with 5-0 monocyrl. The orbicularis oris, superficial fascia, adipose and dermis were then reapproximated.  After the deeper layers were approximated the epidermis was reapproximated with particular care given to realign the vermilion border.
Cheiloplasty (Complex) Text: A decision was made to reconstruct the defect with a  cheiloplasty.  The defect was undermined extensively.  Additional orbicularis oris muscle was excised with a 15 blade scalpel.  The defect was converted into a full thickness wedge to facilite a better cosmetic result.  Small vessels were then tied off with 5-0 monocyrl. The orbicularis oris, superficial fascia, adipose and dermis were then reapproximated.  After the deeper layers were approximated the epidermis was reapproximated with particular care given to realign the vermilion border.
Ear Wedge Repair Text: A wedge excision was completed by carrying down an excision through the full thickness of the ear and cartilage with an inward facing Burow's triangle. The wound was then closed in a layered fashion.
Full Thickness Lip Wedge Repair (Flap) Text: Given the location of the defect and the proximity to free margins a full thickness wedge repair was deemed most appropriate.  Using a sterile surgical marker, the appropriate repair was drawn incorporating the defect and placing the expected incisions perpendicular to the vermilion border.  The vermilion border was also meticulously outlined to ensure appropriate reapproximation during the repair.  The area thus outlined was incised through and through with a #15 scalpel blade.  The muscularis and dermis were reaproximated with deep sutures following hemostasis. Care was taken to realign the vermilion border before proceeding with the superficial closure.  Once the vermilion was realigned the superfical and mucosal closure was finished.
Ftsg Text: The defect edges were debeveled with a #15 scalpel blade.  Given the location of the defect, shape of the defect and the proximity to free margins a full thickness skin graft was deemed most appropriate.  Using a sterile surgical marker, the primary defect shape was transferred to the donor site. The area thus outlined was incised deep to adipose tissue with a #15 scalpel blade.  The harvested graft was then trimmed of adipose tissue until only dermis and epidermis was left.  The skin margins of the secondary defect were undermined to an appropriate distance in all directions utilizing iris scissors.  The secondary defect was closed with interrupted buried subcutaneous sutures.  The skin edges were then re-apposed with running  sutures.  The skin graft was then placed in the primary defect and oriented appropriately.
Split-Thickness Skin Graft Text: The defect edges were debeveled with a #15 scalpel blade.  Given the location of the defect, shape of the defect and the proximity to free margins a split thickness skin graft was deemed most appropriate.  Using a sterile surgical marker, the primary defect shape was transferred to the donor site. The split thickness graft was then harvested.  The skin graft was then placed in the primary defect and oriented appropriately.
Burow's Graft Text: The defect edges were debeveled with a #15 scalpel blade.  Given the location of the defect, shape of the defect, the proximity to free margins and the presence of a standing cone deformity a Burow's skin graft was deemed most appropriate. The standing cone was removed and this tissue was then trimmed to the shape of the primary defect. The adipose tissue was also removed until only dermis and epidermis were left.  The skin margins of the secondary defect were undermined to an appropriate distance in all directions utilizing iris scissors.  The secondary defect was closed with interrupted buried subcutaneous sutures.  The skin edges were then re-apposed with running  sutures.  The skin graft was then placed in the primary defect and oriented appropriately.
Cartilage Graft Text: The defect edges were debeveled with a #15 scalpel blade.  Given the location of the defect, shape of the defect, the fact the defect involved a full thickness cartilage defect a cartilage graft was deemed most appropriate.  An appropriate donor site was identified, cleansed, and anesthetized. The cartilage graft was then harvested and transferred to the recipient site, oriented appropriately and then sutured into place.  The secondary defect was then repaired using a primary closure.
Composite Graft Text: The defect edges were debeveled with a #15 scalpel blade.  Given the location of the defect, shape of the defect, the proximity to free margins and the fact the defect was full thickness a composite graft was deemed most appropriate.  The defect was outline and then transferred to the donor site.  A full thickness graft was then excised from the donor site. The graft was then placed in the primary defect, oriented appropriately and then sutured into place.  The secondary defect was then repaired using a primary closure.
Epidermal Autograft Text: The defect edges were debeveled with a #15 scalpel blade.  Given the location of the defect, shape of the defect and the proximity to free margins an epidermal autograft was deemed most appropriate.  Using a sterile surgical marker, the primary defect shape was transferred to the donor site. The epidermal graft was then harvested.  The skin graft was then placed in the primary defect and oriented appropriately.
Dermal Autograft Text: The defect edges were debeveled with a #15 scalpel blade.  Given the location of the defect, shape of the defect and the proximity to free margins a dermal autograft was deemed most appropriate.  Using a sterile surgical marker, the primary defect shape was transferred to the donor site. The area thus outlined was incised deep to adipose tissue with a #15 scalpel blade.  The harvested graft was then trimmed of adipose and epidermal tissue until only dermis was left.  The skin graft was then placed in the primary defect and oriented appropriately.
Skin Substitute Text: The defect edges were debeveled with a #15 scalpel blade.  Given the location of the defect, shape of the defect and the proximity to free margins a skin substitute graft was deemed most appropriate.  The graft material was trimmed to fit the size of the defect. The graft was then placed in the primary defect and oriented appropriately.
Tissue Cultured Epidermal Autograft Text: The defect edges were debeveled with a #15 scalpel blade.  Given the location of the defect, shape of the defect and the proximity to free margins a tissue cultured epidermal autograft was deemed most appropriate.  The graft was then trimmed to fit the size of the defect.  The graft was then placed in the primary defect and oriented appropriately.
Xenograft Text: The defect edges were debeveled with a #15 scalpel blade.  Given the location of the defect, shape of the defect and the proximity to free margins a xenograft was deemed most appropriate.  The graft was then trimmed to fit the size of the defect.  The graft was then placed in the primary defect and oriented appropriately.
Purse String (Simple) Text: Given the location of the defect and the characteristics of the surrounding skin a purse string closure was deemed most appropriate.  Undermining was performed circumferentially around the surgical defect.  A purse string suture was then placed and tightened.
Purse String (Intermediate) Text: Given the location of the defect and the characteristics of the surrounding skin a purse string intermediate closure was deemed most appropriate.  Undermining was performed circumferentially around the surgical defect.  A purse string suture was then placed and tightened.
Partial Purse String (Simple) Text: Given the location of the defect and the characteristics of the surrounding skin a simple purse string closure was deemed most appropriate.  Undermining was performed circumferentially around the surgical defect.  A purse string suture was then placed and tightened. Wound tension only allowed a partial closure of the circular defect.
Partial Purse String (Intermediate) Text: Given the location of the defect and the characteristics of the surrounding skin an intermediate purse string closure was deemed most appropriate.  Undermining was performed circumferentially around the surgical defect.  A purse string suture was then placed and tightened. Wound tension only allowed a partial closure of the circular defect.
Localized Dermabrasion With Wire Brush Text: The patient was draped in routine manner.  Localized dermabrasion using 3 x 17 mm wire brush was performed in routine manner to papillary dermis. This spot dermabrasion is being performed to complete skin cancer reconstruction. It also will eliminate the other sun damaged precancerous cells that are known to be part of the regional effect of a lifetime's worth of sun exposure. This localized dermabrasion is therapeutic and should not be considered cosmetic in any regard.
Tarsorrhaphy Text: A tarsorrhaphy was performed using Frost sutures.
Complex Repair And Flap Additional Text (Will Appearing After The Standard Complex Repair Text): The complex repair was not sufficient to completely close the primary defect. The remaining additional defect was repaired with the flap mentioned below.
Complex Repair And Graft Additional Text (Will Appearing After The Standard Complex Repair Text): The complex repair was not sufficient to completely close the primary defect. The remaining additional defect was repaired with the graft mentioned below.
Manual Repair Warning Statement: We plan on removing the manually selected variable below in favor of our much easier automatic structured text blocks found in the previous tab. We decided to do this to help make the flow better and give you the full power of structured data. Manual selection is never going to be ideal in our platform and I would encourage you to avoid using manual selection from this point on, especially since I will be sunsetting this feature. It is important that you do one of two things with the customized text below. First, you can save all of the text in a word file so you can have it for future reference. Second, transfer the text to the appropriate area in the Library tab. Lastly, if there is a flap or graft type which we do not have you need to let us know right away so I can add it in before the variable is hidden. No need to panic, we plan to give you roughly 6 months to make the change.
Same Histology In Subsequent Stages Text: The pattern and morphology of the tumor is as described in the first stage.
No Residual Tumor Seen Histology Text: There were no malignant cells seen in the sections examined.
Inflammation Suggestive Of Cancer Camouflage Histology Text: There was a dense lymphocytic infiltrate which prevented adequate histologic evaluation of adjacent structures.
Bcc Histology Text: There were numerous aggregates of basaloid cells.
Bcc Infiltrative Histology Text: There were numerous aggregates of basaloid cells demonstrating an infiltrative pattern.
Mart-1 - Positive Histology Text: MART-1 staining demonstrates areas of higher density and clustering of melanocytes with Pagetoid spread upwards within the epidermis. The surgical margins are positive for tumor cells.
Mart-1 - Negative Histology Text: MART-1 staining demonstrates a normal density and pattern of melanocytes along the dermal-epidermal junction. The surgical margins are negative for tumor cells.
Information: Selecting Yes will display possible errors in your note based on the variables you have selected. This validation is only offered as a suggestion for you. PLEASE NOTE THAT THE VALIDATION TEXT WILL BE REMOVED WHEN YOU FINALIZE YOUR NOTE. IF YOU WANT TO FAX A PRELIMINARY NOTE YOU WILL NEED TO TOGGLE THIS TO 'NO' IF YOU DO NOT WANT IT IN YOUR FAXED NOTE.

## 2022-08-10 ENCOUNTER — APPOINTMENT (RX ONLY)
Dept: URBAN - METROPOLITAN AREA OTHER 1 | Facility: OTHER | Age: 77
Setting detail: DERMATOLOGY
End: 2022-08-10

## 2022-08-10 DIAGNOSIS — Z48.817 ENCOUNTER FOR SURGICAL AFTERCARE FOLLOWING SURGERY ON THE SKIN AND SUBCUTANEOUS TISSUE: ICD-10-CM

## 2022-08-10 PROCEDURE — 99024 POSTOP FOLLOW-UP VISIT: CPT

## 2022-08-10 PROCEDURE — ? POST-OP WOUND CHECK

## 2022-08-10 ASSESSMENT — LOCATION ZONE DERM: LOCATION ZONE: SCALP

## 2022-08-10 ASSESSMENT — LOCATION SIMPLE DESCRIPTION DERM: LOCATION SIMPLE: RIGHT SCALP

## 2022-08-10 ASSESSMENT — LOCATION DETAILED DESCRIPTION DERM: LOCATION DETAILED: RIGHT LATERAL FRONTAL SCALP

## 2022-08-10 NOTE — PROCEDURE: POST-OP WOUND CHECK
Detail Level: Detailed
Add 22363 Cpt? (Important Note: In 2017 The Use Of 39734 Is Being Tracked By Cms To Determine Future Global Period Reimbursement For Global Periods): yes
Wound Assessment Override (Optional): granulation tissue healing well by secondary intention.
Wound Evaluated By: Dr. Beaulieu
Additional Comments: Xeroform and sutures removed.

## 2022-08-19 ENCOUNTER — OFFICE VISIT (OUTPATIENT)
Dept: URBAN - METROPOLITAN AREA TELEHEALTH 2 | Facility: TELEHEALTH | Age: 77
End: 2022-08-19
Payer: COMMERCIAL

## 2022-08-19 ENCOUNTER — TELEPHONE ENCOUNTER (OUTPATIENT)
Dept: URBAN - METROPOLITAN AREA CLINIC 105 | Facility: CLINIC | Age: 77
End: 2022-08-19

## 2022-08-19 VITALS
BODY MASS INDEX: 22.82 KG/M2 | WEIGHT: 142 LBS | DIASTOLIC BLOOD PRESSURE: 78 MMHG | SYSTOLIC BLOOD PRESSURE: 130 MMHG | HEIGHT: 66 IN

## 2022-08-19 DIAGNOSIS — K52.832 LYMPHOCYTIC COLITIS: ICD-10-CM

## 2022-08-19 DIAGNOSIS — Z86.010 HISTORY OF COLON POLYPS: ICD-10-CM

## 2022-08-19 DIAGNOSIS — M19.90 ARTHRITIS: ICD-10-CM

## 2022-08-19 DIAGNOSIS — R19.7 DIARRHEA, UNSPECIFIED TYPE: ICD-10-CM

## 2022-08-19 PROCEDURE — 99213 OFFICE O/P EST LOW 20 MIN: CPT | Performed by: INTERNAL MEDICINE

## 2022-08-19 RX ORDER — AZATHIOPRINE 50 1/1
TAKE 1 TABLET (50 MG) BY ORAL ROUTE ONCE DAILY TABLET ORAL 1
Qty: 0 | Refills: 0 | COMMUNITY
Start: 1900-01-01

## 2022-08-19 RX ORDER — MEGESTROL ACETATE 40 MG/ML
10 ML SUSPENSION ORAL TWICE A DAY
Qty: 600 ML | Refills: 2 | Status: ACTIVE | COMMUNITY
Start: 2022-05-06 | End: 2022-10-06

## 2022-08-19 RX ORDER — DOXEPIN HYDROCHLORIDE 10 MG/1
TAKE 1 CAPSULE BY ORAL ROUTE AS NEEDED CAPSULE ORAL
Qty: 0 | Refills: 0 | COMMUNITY
Start: 1900-01-01

## 2022-08-19 RX ORDER — AMLODIPINE BESYLATE 10 MG/1
TABLET ORAL
Qty: 90 | COMMUNITY

## 2022-08-19 RX ORDER — SERTRALINE 100 MG/1
TAKE 1 TABLET (100 MG) BY ORAL ROUTE ONCE DAILY FOR THE 2 WEEKS BEFORE THE ONSET OF MENSES TABLET, FILM COATED ORAL 1
Qty: 0 | Refills: 0 | COMMUNITY
Start: 1900-01-01

## 2022-08-19 RX ORDER — TACROLIMUS 1 MG/1
6MG IN THE AM AND 7MG IN THE PM CAPSULE, GELATIN COATED ORAL
Qty: 0 | Refills: 0 | COMMUNITY
Start: 1900-01-01

## 2022-08-19 RX ORDER — PREDNISONE 5 MG/1
TAKE 1 TABLET (5 MG) BY ORAL ROUTE ONCE DAILY TABLET ORAL 1
Qty: 0 | Refills: 0 | COMMUNITY
Start: 1900-01-01

## 2022-08-19 RX ORDER — MIRTAZAPINE 7.5 MG/1
TAKE 1 TABLET (7.5 MG) BY ORAL ROUTE ONCE DAILY AT BEDTIME TABLET ORAL 1
Qty: 0 | Refills: 0 | COMMUNITY
Start: 1900-01-01

## 2022-08-19 RX ORDER — TRAVOPROST 0.04 MG/ML
INSTILL 1 DROP IN BOTH EYES EVERY MORNING SOLUTION/ DROPS OPHTHALMIC
Qty: 7.5 | Refills: 1 | COMMUNITY

## 2022-08-19 RX ORDER — GABAPENTIN 300 MG/1
TAKE 1 CAPSULE (300 MG) BY ORAL ROUTE ONCE PER DAY CAPSULE ORAL
Qty: 0 | Refills: 0 | COMMUNITY
Start: 1900-01-01

## 2022-08-19 RX ORDER — FOLIC ACID 1 MG/1
TAKE 1 TABLET (1 MG) BY ORAL ROUTE ONCE DAILY TABLET ORAL 1
Qty: 0 | Refills: 0 | COMMUNITY
Start: 1900-01-01

## 2022-08-19 RX ORDER — DUTASTERIDE 0.5 MG/1
TAKE 1 CAPSULE (0.5 MG) BY ORAL ROUTE ONCE DAILY CAPSULE, LIQUID FILLED ORAL 1
Qty: 0 | Refills: 0 | COMMUNITY
Start: 1900-01-01

## 2022-08-19 RX ORDER — CARVEDILOL 3.12 MG/1
TAKE ONE TABLET BY MOUTH EVERY 12 HOURS TABLET, FILM COATED ORAL
Qty: 60 | Refills: 0 | COMMUNITY

## 2022-08-19 RX ORDER — HYALURONATE SODIUM 0.2 %
APPLY TO AFFECTED AREA(S) CREAM (GRAM) TOPICAL
Qty: 1 | Refills: 0 | COMMUNITY
Start: 1900-01-01

## 2022-08-19 NOTE — HPI-TODAY'S VISIT:
PAST MEDICAL HISTORY: The patient is a /White male, who presents in follow-up for lymphocytic colitis and erosive gastritis noted on his EGD on 2/27/17. Patient has a history of arthritis, peritoneal dialysis (no longer on), knee replacement in October 2015, and inguinal/umbilical hernia repair 2/2017 by Dr. Ponce. Tramadol helped with his arthritis pain. He was seen in the [facility] ED 2/1/17 because of left groin pain. A left inguinal hernia was reduced. A CT in the ED noted some gastric wall thickening. He had stopped taking Tramadol.   His regimen consisted of Entocort 3 mg daily (1/9/17 Entocort resumed at 9 mg daily and decreased to 6 mg 2/6/17, decreased to 3 mg daily on 4/14/17) and Welchol 625 mg, 3 pills daily with lunch. He has been off the omeprazole 20 mg daily and denies any upper GI issues.   On clinic visit 7/14/17, pt reported intermittent watery urgent stools on a daily basis x 2 weeks. Since then he was started on low dose Imodium 1 pill a day along with budesonide to 3 mg daily. On visit 11/22/17 he stated he was having 3 BMs daily with last one being loose. On 3/2/18, he stated his BMs have improved with 3 BMs daily and noted his stools had become less formed throughout the day. He was on budesonide 3 mg daily and WelChol 3 pills daily. He would use Imodium PRN. He was not able to try the Lomotil. On 5/15/18 he noted he was having formed/solid BMs on Welchol 3 pills daily during the week, budesonide 3 mg daily, and Lomotil 1 pill daily.  On 8/16/18, patient continued on budesonide 3 mg daily and increased to BID because of watery diarrhea.  He discontinued Welchol and continued on Lomotil 1 pill a day.  On this regimen he had a solid BM early AM; the second was formed; the third was liquid.  He stated since his last visit he had a couple of bad falls.  He was taking Naltrexone and had nausea/vomiting/abdominal pain so he stopped.  He stated he had significantly decreased his alcohol use.  He noted a significant decrease in his appetite.  On 10/4/18, the patient reported having nausea along with a headache about twice a week for a few hours which was relieved with ondansetron.  However, he had lost his bottle of medication so he had not used it recently.  The patient noted having 2 BMs per day with formed stool while on Lomotil 1 pill BID. The patient was not clear if he is taking the omeprazole 40 mg QD.  He reported he has continued budesonide 3 mg, 2 pills daily.  After his clinic visit he called back and noted he had misplaced his omeprazole bottle and had not been taking it for some time.  He resumed it.  On 10/04/2018, he stated his nausea and headaches have resolved.  He had not had any diarrhea since his last visit except on 10/04/2018.  He was usually having 2 good formed BMs/day.  He was taking budesonide 3 mg, 2 pills daily and Lomotil 1 pill BID.  On 11/29/18, the patient noted he was having regular bowel habits with formed BM. He was currently on Lomotil 1 pill BID and Budesonide 3 mg 2 pills daily.  He denied any acid reflux symptoms on omeprazole 20 mg daily.  He fell again 2 weeks prior prompting an ED visit.  He suffered bruising to left forearm.   He had an x-ray in the ED.  On 6/6/19, he noted having a renal transplant at [facility] on 1/20/19.  He was on azathioprine 50 mg daily, prednisone 5 mg daily, Prograf 5 mg AM and 6 mg PM.  He was also on B12.  He notes Dr. Joaquin noted a decrease in his hemoglobin recently.  The patient denied any blood/melena per rectum.  He was off budesonide and did not take an anti-diarrheal.  He was having a daily formed BM and can 2x/week have a second BM later in the day that is watery.  He also notes urgency.  On 7/18/19, he was following up after an EGD/Colonoscopy.  He continued on B12 daily.  He was having a formed BM 2x/day on Imodium 1 pill daily.  His colonoscopy was negative for microscopic colitis.  He had 1-2 alcohol beverages a month.  On 10/28/19, he said he d/c valacyclovir. He was now taking mirtazapine for depression. His BMs were formed 90% of the time. He had 1-2 BMs/day. He took Imodium 1 pill QD. He had been noting intermittent lower abdominal discomfort in the past month. He had it 3-4x/week and it lasted for about an hour. He described it as an urge to have a BM, but didn't. He denied cramping, bloating, and distention. He d/c omeprazole.   He had 1 beer/day. He would have 1-2 glasses of wine when guests were over. He typically had guests 2x/week. He said he did not have a good appetite. He was trying to eat more. Before his kidney transplant, he was 140 lbs. Afterwards, he dropped down to 130 lbs and said he couldn't manage to go over 135 lbs. He was not drinking Ensure or Boost. He said his depression had gotten worse, so he went to a psychiatrist who adjusted his mirtazapine dosage.   On 12/5/19, he said his creatinine had been elevated to around 1.9 (previously 1.5) and tomorrow he had a biopsy with a transplant team. He d/c the appeitite stimulant/megestrol 400 mg daily after taking for 2-3 weeks. His appetite had improved. He was eating a lot. He put on 5-6 lbs. He had only taken hyoscyamine 0.125 mg once for abdominal discomfort and it worked within 30 min. He still took Imodium 1 pill QD. He said it was effective. He only drank socially and had up to 2 glasses of wine.  On 6/17/21, he said his bowel habit was fine. He took Imodium 1 pill QD. He had 1-2 formed BMs/day. He had not been experiencing reflux symptoms or abdominal pain. His appetite was adequate. He was off vit B12. He continued on iron.  He had 2 alcohol drinks/night on the weekends and 1-2 drinks/night 1x during the week but was trying to hold on use during the week. He had COVID in Dec and lost 20 lbs during that time and was subsequently put on Marinol by his PCP. He had recent labwork with his PCP.  On 5/6/22, he said he was on medication for anxiety and depression which worked. He had COVID last year and lost 30 lbs as a result. He was put on Marinol to stimulate his appetite and gained 10 lbs, but it was difficult for him to keep his weight up. Dr. Joaquin ordered a CT today. He followed with a nutritionist - 1x/2 weeks - increasing caloric intake. He was not drinking any supplements, but had planned to start drinking Kefir. He was currently 138-139 lbs. He had taken Marinol for 2-3 weeks and his weight increased to 145 lbs.  He noted Marinol caused him to get lost in his own home so he would take it before bed.  He "vaguely" remembered taking Megace, but did not remember the benefit.   He would see Dr. Joaquin next month. Most recent labs were 2 weeks ago. Alcohol use was now only on weekends - 2 glasses of wine on Friday and 2 glasses on Saturday. Recent liver enzyme levels were normal he stated. He was not on B12 supplementation or omeprazole.  On 7/8/22, he said he had been on Megace 400 mg daily for about 5 weeks - some improvement. He worked with a nutritionist. He wanted to get back above 140 lbs. He had been having urgent BMs - now taking Imodium 1 pill BID which helped. After the colonoscopy, he would have a good BM earlier in the day followed by urgent BMs later. He had 4-5 BMs QD that is watery 1-2x/day.  On 7/19/22, he said his bowel habit was unchanged following the bowel prep. His BMs started out as "strips" then became "balls" later in the day. His BM frequency bothered him. Before Miralax, he had urgent, liquidy stools. Currently, he was taking Miralax 0.75 cap BID. He continued on Megace and was eating more.  HPI: Today, he says his bowel habit is unchanged currently on Miralax 1 cap QD - at 7-7:30 am he has a solid BM followed by another BM an hour later that is occasionally solid followed by 3-5 very loose, watery BMs throughout the rest of the day. He has never decreased his Miralax dosage.   He says his appetite is "medium" - not using stimulant - and he notes some weight gain.  Labs 5/21/21 - COVID positive. CBC normal except hgb 12.8, .2. CMP normal except BUN 30, creatinine 1.46, GFR 47. Chol 252, , non . 7/22/19 - CMP normal except BUN 29, creatinine 1.48, sodium 134, GFR 47. CBC normal except hgb 9.8, .2. Ferritin 344.40, TIBC 246, iron sat 55%, UIBC 110, transferrin 198, vitamin B-12 1253. Folate normal. 5/10/19 Hgb 9.4, .2, B12 > 2000, Folate 10.

## 2022-08-31 ENCOUNTER — APPOINTMENT (RX ONLY)
Dept: URBAN - METROPOLITAN AREA OTHER 1 | Facility: OTHER | Age: 77
Setting detail: DERMATOLOGY
End: 2022-08-31

## 2022-08-31 DIAGNOSIS — Z48.817 ENCOUNTER FOR SURGICAL AFTERCARE FOLLOWING SURGERY ON THE SKIN AND SUBCUTANEOUS TISSUE: ICD-10-CM

## 2022-08-31 PROCEDURE — 99024 POSTOP FOLLOW-UP VISIT: CPT

## 2022-08-31 PROCEDURE — ? POST-OP WOUND CHECK

## 2022-08-31 ASSESSMENT — LOCATION ZONE DERM: LOCATION ZONE: SCALP

## 2022-08-31 ASSESSMENT — LOCATION DETAILED DESCRIPTION DERM: LOCATION DETAILED: RIGHT LATERAL FRONTAL SCALP

## 2022-08-31 ASSESSMENT — LOCATION SIMPLE DESCRIPTION DERM: LOCATION SIMPLE: RIGHT SCALP

## 2022-08-31 NOTE — PROCEDURE: POST-OP WOUND CHECK
Detail Level: Detailed
Add 59202 Cpt? (Important Note: In 2017 The Use Of 95877 Is Being Tracked By Cms To Determine Future Global Period Reimbursement For Global Periods): yes
Wound Evaluated By: Alisia Yadav PA-C and Dr. Beaulieu

## 2022-09-08 NOTE — PHYSICAL EXAM HENT:
Head , normocephalic , atraumatic , Face , Face within normal limits , Ears , External ears within normal limits , Nose/Nasopharynx , External nose  normal appearance  H

## 2022-09-26 ENCOUNTER — APPOINTMENT (RX ONLY)
Dept: URBAN - METROPOLITAN AREA OTHER 1 | Facility: OTHER | Age: 77
Setting detail: DERMATOLOGY
End: 2022-09-26

## 2022-09-26 DIAGNOSIS — Z48.817 ENCOUNTER FOR SURGICAL AFTERCARE FOLLOWING SURGERY ON THE SKIN AND SUBCUTANEOUS TISSUE: ICD-10-CM

## 2022-09-26 PROCEDURE — 99024 POSTOP FOLLOW-UP VISIT: CPT

## 2022-09-26 PROCEDURE — ? OTHER

## 2022-09-26 PROCEDURE — ? POST-OP WOUND CHECK

## 2022-09-26 ASSESSMENT — LOCATION ZONE DERM: LOCATION ZONE: SCALP

## 2022-09-26 ASSESSMENT — LOCATION SIMPLE DESCRIPTION DERM: LOCATION SIMPLE: RIGHT SCALP

## 2022-09-26 ASSESSMENT — LOCATION DETAILED DESCRIPTION DERM: LOCATION DETAILED: RIGHT CENTRAL FRONTAL SCALP

## 2022-09-26 NOTE — PROCEDURE: POST-OP WOUND CHECK
Detail Level: Detailed
Add 03349 Cpt? (Important Note: In 2017 The Use Of 56536 Is Being Tracked By Cms To Determine Future Global Period Reimbursement For Global Periods): yes
Additional Comments: 100% granulation tissue healed well.

## 2022-09-26 NOTE — PROCEDURE: OTHER
no necessary equipment needed at this time.
Render Risk Assessment In Note?: no
Note Text (......Xxx Chief Complaint.): This diagnosis correlates with the
Other (Free Text): Advised patient to follow up with Dr. Kyle.
Detail Level: Zone

## 2022-10-11 ENCOUNTER — OFFICE VISIT (OUTPATIENT)
Dept: URBAN - METROPOLITAN AREA MEDICAL CENTER 33 | Facility: MEDICAL CENTER | Age: 77
End: 2022-10-11
Payer: COMMERCIAL

## 2022-10-11 DIAGNOSIS — D12.3 ADENOMA OF TRANSVERSE COLON: ICD-10-CM

## 2022-10-11 DIAGNOSIS — R19.7 ACUTE DIARRHEA: ICD-10-CM

## 2022-10-11 PROCEDURE — 45380 COLONOSCOPY AND BIOPSY: CPT | Performed by: INTERNAL MEDICINE

## 2022-10-27 ENCOUNTER — TELEPHONE ENCOUNTER (OUTPATIENT)
Dept: URBAN - METROPOLITAN AREA CLINIC 105 | Facility: CLINIC | Age: 77
End: 2022-10-27

## 2022-10-28 ENCOUNTER — OFFICE VISIT (OUTPATIENT)
Dept: URBAN - METROPOLITAN AREA CLINIC 105 | Facility: CLINIC | Age: 77
End: 2022-10-28
Payer: COMMERCIAL

## 2022-10-28 DIAGNOSIS — R63.0 ANOREXIA: ICD-10-CM

## 2022-10-28 DIAGNOSIS — Z94.0 RENAL TRANSPLANT RECIPIENT: ICD-10-CM

## 2022-10-28 DIAGNOSIS — E53.8 VITAMIN B12 DEFICIENCY: ICD-10-CM

## 2022-10-28 DIAGNOSIS — R10.9 ABDOMINAL PAIN, UNSPECIFIED ABDOMINAL LOCATION: ICD-10-CM

## 2022-10-28 DIAGNOSIS — K52.832 LYMPHOCYTIC COLITIS: ICD-10-CM

## 2022-10-28 DIAGNOSIS — F32.9 DEPRESSION, UNSPECIFIED DEPRESSION TYPE: ICD-10-CM

## 2022-10-28 DIAGNOSIS — D53.9 MACROCYTIC ANEMIA: ICD-10-CM

## 2022-10-28 DIAGNOSIS — K29.60 EROSIVE GASTRITIS: ICD-10-CM

## 2022-10-28 DIAGNOSIS — M19.90 ARTHRITIS: ICD-10-CM

## 2022-10-28 DIAGNOSIS — R19.7 DIARRHEA, UNSPECIFIED TYPE: ICD-10-CM

## 2022-10-28 DIAGNOSIS — Z86.010 HISTORY OF COLON POLYPS: ICD-10-CM

## 2022-10-28 DIAGNOSIS — F10.10 ALCOHOL ABUSE: ICD-10-CM

## 2022-10-28 PROCEDURE — 99213 OFFICE O/P EST LOW 20 MIN: CPT | Performed by: INTERNAL MEDICINE

## 2022-10-28 RX ORDER — DUTASTERIDE 0.5 MG/1
TAKE 1 CAPSULE (0.5 MG) BY ORAL ROUTE ONCE DAILY CAPSULE, LIQUID FILLED ORAL 1
Qty: 0 | Refills: 0 | COMMUNITY
Start: 1900-01-01

## 2022-10-28 RX ORDER — PREDNISONE 5 MG/1
TAKE 1 TABLET (5 MG) BY ORAL ROUTE ONCE DAILY TABLET ORAL 1
Qty: 0 | Refills: 0 | COMMUNITY
Start: 1900-01-01

## 2022-10-28 RX ORDER — TACROLIMUS 1 MG/1
6MG IN THE AM AND 7MG IN THE PM CAPSULE, GELATIN COATED ORAL
Qty: 0 | Refills: 0 | COMMUNITY
Start: 1900-01-01

## 2022-10-28 RX ORDER — SERTRALINE 100 MG/1
TAKE 1 TABLET (100 MG) BY ORAL ROUTE ONCE DAILY FOR THE 2 WEEKS BEFORE THE ONSET OF MENSES TABLET, FILM COATED ORAL 1
Qty: 0 | Refills: 0 | COMMUNITY
Start: 1900-01-01

## 2022-10-28 RX ORDER — DOXEPIN HYDROCHLORIDE 10 MG/1
TAKE 1 CAPSULE BY ORAL ROUTE AS NEEDED CAPSULE ORAL
Qty: 0 | Refills: 0 | COMMUNITY
Start: 1900-01-01

## 2022-10-28 RX ORDER — MIRTAZAPINE 7.5 MG/1
TAKE 1 TABLET (7.5 MG) BY ORAL ROUTE ONCE DAILY AT BEDTIME TABLET ORAL 1
Qty: 0 | Refills: 0 | COMMUNITY
Start: 1900-01-01

## 2022-10-28 RX ORDER — FOLIC ACID 1 MG/1
TAKE 1 TABLET (1 MG) BY ORAL ROUTE ONCE DAILY TABLET ORAL 1
Qty: 0 | Refills: 0 | COMMUNITY
Start: 1900-01-01

## 2022-10-28 RX ORDER — HYALURONATE SODIUM 0.2 %
APPLY TO AFFECTED AREA(S) CREAM (GRAM) TOPICAL
Qty: 1 | Refills: 0 | COMMUNITY
Start: 1900-01-01

## 2022-10-28 RX ORDER — AMLODIPINE BESYLATE 10 MG/1
TABLET ORAL
Qty: 90 | COMMUNITY

## 2022-10-28 RX ORDER — AZATHIOPRINE 50 1/1
TAKE 1 TABLET (50 MG) BY ORAL ROUTE ONCE DAILY TABLET ORAL 1
Qty: 0 | Refills: 0 | COMMUNITY
Start: 1900-01-01

## 2022-10-28 RX ORDER — TRAVOPROST 0.04 MG/ML
INSTILL 1 DROP IN BOTH EYES EVERY MORNING SOLUTION/ DROPS OPHTHALMIC
Qty: 7.5 | Refills: 1 | COMMUNITY

## 2022-10-28 RX ORDER — GABAPENTIN 300 MG/1
TAKE 1 CAPSULE (300 MG) BY ORAL ROUTE ONCE PER DAY CAPSULE ORAL
Qty: 0 | Refills: 0 | COMMUNITY
Start: 1900-01-01

## 2022-10-28 RX ORDER — CARVEDILOL 3.12 MG/1
TAKE ONE TABLET BY MOUTH EVERY 12 HOURS TABLET, FILM COATED ORAL
Qty: 60 | Refills: 0 | COMMUNITY

## 2022-10-28 NOTE — HPI-TODAY'S VISIT:
PAST MEDICAL HISTORY: The patient is a /White male, who presents in follow-up for lymphocytic colitis and erosive gastritis noted on his EGD on 2/27/17. Patient has a history of arthritis, peritoneal dialysis (no longer on), knee replacement in October 2015, and inguinal/umbilical hernia repair 2/2017 by Dr. Ponce. Tramadol helped with his arthritis pain. He was seen in the [facility] ED 2/1/17 because of left groin pain. A left inguinal hernia was reduced. A CT in the ED noted some gastric wall thickening. He had stopped taking Tramadol.   His regimen consisted of Entocort 3 mg daily (1/9/17 Entocort resumed at 9 mg daily and decreased to 6 mg 2/6/17, decreased to 3 mg daily on 4/14/17) and Welchol 625 mg, 3 pills daily with lunch. He has been off the omeprazole 20 mg daily and denies any upper GI issues.   On clinic visit 7/14/17, pt reported intermittent watery urgent stools on a daily basis x 2 weeks. Since then he was started on low dose Imodium 1 pill a day along with budesonide to 3 mg daily. On visit 11/22/17 he stated he was having 3 BMs daily with last one being loose. On 3/2/18, he stated his BMs have improved with 3 BMs daily and noted his stools had become less formed throughout the day. He was on budesonide 3 mg daily and WelChol 3 pills daily. He would use Imodium PRN. He was not able to try the Lomotil. On 5/15/18 he noted he was having formed/solid BMs on Welchol 3 pills daily during the week, budesonide 3 mg daily, and Lomotil 1 pill daily.  On 8/16/18, patient continued on budesonide 3 mg daily and increased to BID because of watery diarrhea.  He discontinued Welchol and continued on Lomotil 1 pill a day.  On this regimen he had a solid BM early AM; the second was formed; the third was liquid.  He stated since his last visit he had a couple of bad falls.  He was taking Naltrexone and had nausea/vomiting/abdominal pain so he stopped.  He stated he had significantly decreased his alcohol use.  He noted a significant decrease in his appetite.  On 10/4/18, the patient reported having nausea along with a headache about twice a week for a few hours which was relieved with ondansetron.  However, he had lost his bottle of medication so he had not used it recently.  The patient noted having 2 BMs per day with formed stool while on Lomotil 1 pill BID. The patient was not clear if he is taking the omeprazole 40 mg QD.  He reported he has continued budesonide 3 mg, 2 pills daily.  After his clinic visit he called back and noted he had misplaced his omeprazole bottle and had not been taking it for some time.  He resumed it.  On 10/04/2018, he stated his nausea and headaches have resolved.  He had not had any diarrhea since his last visit except on 10/04/2018.  He was usually having 2 good formed BMs/day.  He was taking budesonide 3 mg, 2 pills daily and Lomotil 1 pill BID.  On 11/29/18, the patient noted he was having regular bowel habits with formed BM. He was currently on Lomotil 1 pill BID and Budesonide 3 mg 2 pills daily.  He denied any acid reflux symptoms on omeprazole 20 mg daily.  He fell again 2 weeks prior prompting an ED visit.  He suffered bruising to left forearm.   He had an x-ray in the ED.  On 6/6/19, he noted having a renal transplant at [facility] on 1/20/19.  He was on azathioprine 50 mg daily, prednisone 5 mg daily, Prograf 5 mg AM and 6 mg PM.  He was also on B12.  He notes Dr. Joaquin noted a decrease in his hemoglobin recently.  The patient denied any blood/melena per rectum.  He was off budesonide and did not take an anti-diarrheal.  He was having a daily formed BM and can 2x/week have a second BM later in the day that is watery.  He also notes urgency.  On 7/18/19, he was following up after an EGD/Colonoscopy.  He continued on B12 daily.  He was having a formed BM 2x/day on Imodium 1 pill daily.  His colonoscopy was negative for microscopic colitis.  He had 1-2 alcohol beverages a month.  On 10/28/19, he said he d/c valacyclovir. He was now taking mirtazapine for depression. His BMs were formed 90% of the time. He had 1-2 BMs/day. He took Imodium 1 pill QD. He had been noting intermittent lower abdominal discomfort in the past month. He had it 3-4x/week and it lasted for about an hour. He described it as an urge to have a BM, but didn't. He denied cramping, bloating, and distention. He d/c omeprazole.   He had 1 beer/day. He would have 1-2 glasses of wine when guests were over. He typically had guests 2x/week. He said he did not have a good appetite. He was trying to eat more. Before his kidney transplant, he was 140 lbs. Afterwards, he dropped down to 130 lbs and said he couldn't manage to go over 135 lbs. He was not drinking Ensure or Boost. He said his depression had gotten worse, so he went to a psychiatrist who adjusted his mirtazapine dosage.   On 12/5/19, he said his creatinine had been elevated to around 1.9 (previously 1.5) and tomorrow he had a biopsy with a transplant team. He d/c the appeitite stimulant/megestrol 400 mg daily after taking for 2-3 weeks. His appetite had improved. He was eating a lot. He put on 5-6 lbs. He had only taken hyoscyamine 0.125 mg once for abdominal discomfort and it worked within 30 min. He still took Imodium 1 pill QD. He said it was effective. He only drank socially and had up to 2 glasses of wine.  On 6/17/21, he said his bowel habit was fine. He took Imodium 1 pill QD. He had 1-2 formed BMs/day. He had not been experiencing reflux symptoms or abdominal pain. His appetite was adequate. He was off vit B12. He continued on iron.  He had 2 alcohol drinks/night on the weekends and 1-2 drinks/night 1x during the week but was trying to hold on use during the week. He had COVID in Dec and lost 20 lbs during that time and was subsequently put on Marinol by his PCP. He had recent labwork with his PCP.  On 5/6/22, he said he was on medication for anxiety and depression which worked. He had COVID last year and lost 30 lbs as a result. He was put on Marinol to stimulate his appetite and gained 10 lbs, but it was difficult for him to keep his weight up. Dr. Joaquin ordered a CT today. He followed with a nutritionist - 1x/2 weeks - increasing caloric intake. He was not drinking any supplements, but had planned to start drinking Kefir. He was currently 138-139 lbs. He had taken Marinol for 2-3 weeks and his weight increased to 145 lbs.  He noted Marinol caused him to get lost in his own home so he would take it before bed.  He "vaguely" remembered taking Megace, but did not remember the benefit.   He would see Dr. Joaquin next month. Most recent labs were 2 weeks ago. Alcohol use was now only on weekends - 2 glasses of wine on Friday and 2 glasses on Saturday. Recent liver enzyme levels were normal he stated. He was not on B12 supplementation or omeprazole.  On 7/8/22, he said he had been on Megace 400 mg daily for about 5 weeks - some improvement. He worked with a nutritionist. He wanted to get back above 140 lbs. He had been having urgent BMs - now taking Imodium 1 pill BID which helped. After the colonoscopy, he would have a good BM earlier in the day followed by urgent BMs later. He had 4-5 BMs QD that is watery 1-2x/day.  On 7/19/22, he said his bowel habit was unchanged following the bowel prep. His BMs started out as "strips" then became "balls" later in the day. His BM frequency bothered him. Before Miralax, he had urgent, liquidy stools. Currently, he was taking Miralax 0.75 cap BID. He continued on Megace and was eating more.  On 8/19/22, he said his bowel habit was unchanged currently on Miralax 1 cap QD - at 7-7:30 am he had a solid BM followed by another BM an hour later that was occasionally solid followed by 3-5 very loose, watery BMs throughout the rest of the day. He had never decreased his Miralax dosage.   He said his appetite was "medium" - not using stimulant - and he noted some weight gain.  HPI: Today, he says that while prepping for his colon the week prior to his colon, his BM frequency decreased. Following his colon, his freq was still decreased - 1 BM in morning, another BM in the morning that was soft/liquidy and another BM in the afternoon that was liquid. For the past week, his last 2 BMs have been more liquidy he states. Before starting on colon prep, he was taking Miralax 0.5 cap QD. He is back to the 0.5 cap QD - feels like his BMs are improved on this dose. He is not taking Imodium.  Labs 5/21/21 - COVID positive. CBC normal except hgb 12.8, .2. CMP normal except BUN 30, creatinine 1.46, GFR 47. Chol 252, , non . 7/22/19 - CMP normal except BUN 29, creatinine 1.48, sodium 134, GFR 47. CBC normal except hgb 9.8, .2. Ferritin 344.40, TIBC 246, iron sat 55%, UIBC 110, transferrin 198, vitamin B-12 1253. Folate normal. 5/10/19 Hgb 9.4, .2, B12 > 2000, Folate 10.

## 2022-11-04 ENCOUNTER — OFFICE VISIT (OUTPATIENT)
Dept: URBAN - METROPOLITAN AREA TELEHEALTH 2 | Facility: TELEHEALTH | Age: 77
End: 2022-11-04

## 2022-12-02 ENCOUNTER — OFFICE VISIT (OUTPATIENT)
Dept: URBAN - METROPOLITAN AREA TELEHEALTH 2 | Facility: TELEHEALTH | Age: 77
End: 2022-12-02
Payer: COMMERCIAL

## 2022-12-02 VITALS — HEIGHT: 66 IN

## 2022-12-02 DIAGNOSIS — R10.11 RUQ ABDOMINAL PAIN: ICD-10-CM

## 2022-12-02 DIAGNOSIS — R63.0 ANOREXIA: ICD-10-CM

## 2022-12-02 DIAGNOSIS — R19.7 DIARRHEA, UNSPECIFIED TYPE: ICD-10-CM

## 2022-12-02 DIAGNOSIS — Z94.0 RENAL TRANSPLANT RECIPIENT: ICD-10-CM

## 2022-12-02 PROCEDURE — 99213 OFFICE O/P EST LOW 20 MIN: CPT | Performed by: INTERNAL MEDICINE

## 2022-12-02 RX ORDER — MIRTAZAPINE 7.5 MG/1
TAKE 1 TABLET (7.5 MG) BY ORAL ROUTE ONCE DAILY AT BEDTIME TABLET ORAL 1
Qty: 0 | Refills: 0 | Status: ACTIVE | COMMUNITY
Start: 1900-01-01

## 2022-12-02 RX ORDER — AZATHIOPRINE 50 1/1
TAKE 1 TABLET (50 MG) BY ORAL ROUTE ONCE DAILY TABLET ORAL 1
Qty: 0 | Refills: 0 | COMMUNITY
Start: 1900-01-01

## 2022-12-02 RX ORDER — PREDNISONE 5 MG/1
TAKE 1 TABLET (5 MG) BY ORAL ROUTE ONCE DAILY TABLET ORAL 1
Qty: 0 | Refills: 0 | COMMUNITY
Start: 1900-01-01

## 2022-12-02 RX ORDER — FOLIC ACID 1 MG/1
TAKE 1 TABLET (1 MG) BY ORAL ROUTE ONCE DAILY TABLET ORAL 1
Qty: 0 | Refills: 0 | COMMUNITY
Start: 1900-01-01

## 2022-12-02 RX ORDER — AMLODIPINE BESYLATE 10 MG/1
TABLET ORAL
Qty: 90 | COMMUNITY

## 2022-12-02 RX ORDER — GABAPENTIN 300 MG/1
TAKE 1 CAPSULE (300 MG) BY ORAL ROUTE ONCE PER DAY CAPSULE ORAL
Qty: 0 | Refills: 0 | COMMUNITY
Start: 1900-01-01

## 2022-12-02 RX ORDER — TRAVOPROST 0.04 MG/ML
INSTILL 1 DROP IN BOTH EYES EVERY MORNING SOLUTION/ DROPS OPHTHALMIC
Qty: 7.5 | Refills: 1 | COMMUNITY

## 2022-12-02 RX ORDER — DUTASTERIDE 0.5 MG/1
TAKE 1 CAPSULE (0.5 MG) BY ORAL ROUTE ONCE DAILY CAPSULE, LIQUID FILLED ORAL 1
Qty: 0 | Refills: 0 | COMMUNITY
Start: 1900-01-01

## 2022-12-02 RX ORDER — SERTRALINE 100 MG/1
TAKE 1 TABLET (100 MG) BY ORAL ROUTE ONCE DAILY FOR THE 2 WEEKS BEFORE THE ONSET OF MENSES TABLET, FILM COATED ORAL 1
Qty: 0 | Refills: 0 | COMMUNITY
Start: 1900-01-01

## 2022-12-02 RX ORDER — TACROLIMUS 1 MG/1
6MG IN THE AM AND 7MG IN THE PM CAPSULE, GELATIN COATED ORAL
Qty: 0 | Refills: 0 | COMMUNITY
Start: 1900-01-01

## 2022-12-02 RX ORDER — CARVEDILOL 3.12 MG/1
TAKE ONE TABLET BY MOUTH EVERY 12 HOURS TABLET, FILM COATED ORAL
Qty: 60 | Refills: 0 | COMMUNITY

## 2022-12-02 RX ORDER — DOXEPIN HYDROCHLORIDE 10 MG/1
TAKE 1 CAPSULE BY ORAL ROUTE AS NEEDED CAPSULE ORAL
Qty: 0 | Refills: 0 | COMMUNITY
Start: 1900-01-01

## 2022-12-02 RX ORDER — HYALURONATE SODIUM 0.2 %
APPLY TO AFFECTED AREA(S) CREAM (GRAM) TOPICAL
Qty: 1 | Refills: 0 | COMMUNITY
Start: 1900-01-01

## 2022-12-02 NOTE — HPI-TODAY'S VISIT:
PAST MEDICAL HISTORY: The patient is a /White male, who presents in follow-up for lymphocytic colitis and erosive gastritis noted on his EGD on 2/27/17. Patient has a history of arthritis, peritoneal dialysis (no longer on), knee replacement in October 2015, and inguinal/umbilical hernia repair 2/2017 by Dr. Ponce. Tramadol helped with his arthritis pain. He was seen in the [facility] ED 2/1/17 because of left groin pain. A left inguinal hernia was reduced. A CT in the ED noted some gastric wall thickening. He had stopped taking Tramadol.   His regimen consisted of Entocort 3 mg daily (1/9/17 Entocort resumed at 9 mg daily and decreased to 6 mg 2/6/17, decreased to 3 mg daily on 4/14/17) and Welchol 625 mg, 3 pills daily with lunch. He has been off the omeprazole 20 mg daily and denies any upper GI issues.   On clinic visit 7/14/17, pt reported intermittent watery urgent stools on a daily basis x 2 weeks. Since then he was started on low dose Imodium 1 pill a day along with budesonide to 3 mg daily. On visit 11/22/17 he stated he was having 3 BMs daily with last one being loose. On 3/2/18, he stated his BMs have improved with 3 BMs daily and noted his stools had become less formed throughout the day. He was on budesonide 3 mg daily and WelChol 3 pills daily. He would use Imodium PRN. He was not able to try the Lomotil. On 5/15/18 he noted he was having formed/solid BMs on Welchol 3 pills daily during the week, budesonide 3 mg daily, and Lomotil 1 pill daily.  On 8/16/18, patient continued on budesonide 3 mg daily and increased to BID because of watery diarrhea.  He discontinued Welchol and continued on Lomotil 1 pill a day.  On this regimen he had a solid BM early AM; the second was formed; the third was liquid.  He stated since his last visit he had a couple of bad falls.  He was taking Naltrexone and had nausea/vomiting/abdominal pain so he stopped.  He stated he had significantly decreased his alcohol use.  He noted a significant decrease in his appetite.  On 10/4/18, the patient reported having nausea along with a headache about twice a week for a few hours which was relieved with ondansetron.  However, he had lost his bottle of medication so he had not used it recently.  The patient noted having 2 BMs per day with formed stool while on Lomotil 1 pill BID. The patient was not clear if he is taking the omeprazole 40 mg QD.  He reported he has continued budesonide 3 mg, 2 pills daily.  After his clinic visit he called back and noted he had misplaced his omeprazole bottle and had not been taking it for some time.  He resumed it.  On 10/04/2018, he stated his nausea and headaches have resolved.  He had not had any diarrhea since his last visit except on 10/04/2018.  He was usually having 2 good formed BMs/day.  He was taking budesonide 3 mg, 2 pills daily and Lomotil 1 pill BID.  On 11/29/18, the patient noted he was having regular bowel habits with formed BM. He was currently on Lomotil 1 pill BID and Budesonide 3 mg 2 pills daily.  He denied any acid reflux symptoms on omeprazole 20 mg daily.  He fell again 2 weeks prior prompting an ED visit.  He suffered bruising to left forearm.   He had an x-ray in the ED.  On 6/6/19, he noted having a renal transplant at [place] on 1/20/19.  He was on azathioprine 50 mg daily, prednisone 5 mg daily, Prograf 5 mg AM and 6 mg PM.  He was also on B12.  He notes Dr. Joaquin noted a decrease in his hemoglobin recently.  The patient denied any blood/melena per rectum.  He was off budesonide and did not take an anti-diarrheal.  He was having a daily formed BM and can 2x/week have a second BM later in the day that is watery.  He also notes urgency.  On 7/18/19, he was following up after an EGD/Colonoscopy.  He continued on B12 daily.  He was having a formed BM 2x/day on Imodium 1 pill daily.  His colonoscopy was negative for microscopic colitis.  He had 1-2 alcohol beverages a month.  On 10/28/19, he said he d/c valacyclovir. He was now taking mirtazapine for depression. His BMs were formed 90% of the time. He had 1-2 BMs/day. He took Imodium 1 pill QD. He had been noting intermittent lower abdominal discomfort in the past month. He had it 3-4x/week and it lasted for about an hour. He described it as an urge to have a BM, but didn't. He denied cramping, bloating, and distention. He d/c omeprazole.   He had 1 beer/day. He would have 1-2 glasses of wine when guests were over. He typically had guests 2x/week. He said he did not have a good appetite. He was trying to eat more. Before his kidney transplant, he was 140 lbs. Afterwards, he dropped down to 130 lbs and said he couldn't manage to go over 135 lbs. He was not drinking Ensure or Boost. He said his depression had gotten worse, so he went to a psychiatrist who adjusted his mirtazapine dosage.   On 12/5/19, he said his creatinine had been elevated to around 1.9 (previously 1.5) and tomorrow he had a biopsy with a transplant team. He d/c the appeitite stimulant/megestrol 400 mg daily after taking for 2-3 weeks. His appetite had improved. He was eating a lot. He put on 5-6 lbs. He had only taken hyoscyamine 0.125 mg once for abdominal discomfort and it worked within 30 min. He still took Imodium 1 pill QD. He said it was effective. He only drank socially and had up to 2 glasses of wine.  On 6/17/21, he said his bowel habit was fine. He took Imodium 1 pill QD. He had 1-2 formed BMs/day. He had not been experiencing reflux symptoms or abdominal pain. His appetite was adequate. He was off vit B12. He continued on iron.  He had 2 alcohol drinks/night on the weekends and 1-2 drinks/night 1x during the week but was trying to hold on use during the week. He had COVID in Dec and lost 20 lbs during that time and was subsequently put on Marinol by his PCP. He had recent labwork with his PCP.  On 5/6/22, he said he was on medication for anxiety and depression which worked. He had COVID last year and lost 30 lbs as a result. He was put on Marinol to stimulate his appetite and gained 10 lbs, but it was difficult for him to keep his weight up. Dr. Joaquin ordered a CT today. He followed with a nutritionist - 1x/2 weeks - increasing caloric intake. He was not drinking any supplements, but had planned to start drinking Kefir. He was currently 138-139 lbs. He had taken Marinol for 2-3 weeks and his weight increased to 145 lbs.  He noted Marinol caused him to get lost in his own home so he would take it before bed.  He "vaguely" remembered taking Megace, but did not remember the benefit.   He would see Dr. Joaquin next month. Most recent labs were 2 weeks ago. Alcohol use was now only on weekends - 2 glasses of wine on Friday and 2 glasses on Saturday. Recent liver enzyme levels were normal he stated. He was not on B12 supplementation or omeprazole.  On 7/8/22, he said he had been on Megace 400 mg daily for about 5 weeks - some improvement. He worked with a nutritionist. He wanted to get back above 140 lbs. He had been having urgent BMs - now taking Imodium 1 pill BID which helped. After the colonoscopy, he would have a good BM earlier in the day followed by urgent BMs later. He had 4-5 BMs QD that is watery 1-2x/day.  On 7/19/22, he said his bowel habit was unchanged following the bowel prep. His BMs started out as "strips" then became "balls" later in the day. His BM frequency bothered him. Before Miralax, he had urgent, liquidy stools. Currently, he was taking Miralax 0.75 cap BID. He continued on Megace and was eating more.  On 8/19/22, he said his bowel habit was unchanged currently on Miralax 1 cap QD - at 7-7:30 am he had a solid BM followed by another BM an hour later that was occasionally solid followed by 3-5 very loose, watery BMs throughout the rest of the day. He had never decreased his Miralax dosage.   He said his appetite was "medium" - not using stimulant - and he noted some weight gain.  On 10/28/22, he said that while prepping for his colon the week prior to his colon, his BM frequency decreased. Following his colon, his freq was still decreased - 1 BM in morning, another BM in the morning that was soft/liquidy and another BM in the afternoon that was liquid. For the past week, his last 2 BMs have been more liquidy he stated. Before starting on colon prep, he was taking Miralax 0.5 cap QD. He was back to the 0.5 cap QD - felt like his BMs were improved on this dose. He was not taking Imodium.  HPI: Today, he says he has 2 BMs in the mornings that is first formed then is looser. He has 2 more BMs in the afternoon that are not well-formed or are liquidy. He is now on Miralax 0.5 cap QOD.   He had an ultrasound done (Dr. Joaquin) for occasional RUQ subcostal pain. US was normal. He only has pain when he is lying on the floor doing stretches and exercises.  Labs 5/21/21 - COVID positive. CBC normal except hgb 12.8, .2. CMP normal except BUN 30, creatinine 1.46, GFR 47. Chol 252, , non . 7/22/19 - CMP normal except BUN 29, creatinine 1.48, sodium 134, GFR 47. CBC normal except hgb 9.8, .2. Ferritin 344.40, TIBC 246, iron sat 55%, UIBC 110, transferrin 198, vitamin B-12 1253. Folate normal. 5/10/19 Hgb 9.4, .2, B12 > 2000, Folate 10.

## 2023-01-06 ENCOUNTER — OFFICE VISIT (OUTPATIENT)
Dept: URBAN - METROPOLITAN AREA TELEHEALTH 2 | Facility: TELEHEALTH | Age: 78
End: 2023-01-06
Payer: COMMERCIAL

## 2023-01-06 VITALS — WEIGHT: 145 LBS | BODY MASS INDEX: 23.3 KG/M2 | HEIGHT: 66 IN

## 2023-01-06 DIAGNOSIS — R19.7 DIARRHEA, UNSPECIFIED TYPE: ICD-10-CM

## 2023-01-06 DIAGNOSIS — D50.8 ACQUIRED IRON DEFICIENCY ANEMIA DUE TO DECREASED ABSORPTION: ICD-10-CM

## 2023-01-06 DIAGNOSIS — K29.60 EROSIVE GASTRITIS: ICD-10-CM

## 2023-01-06 DIAGNOSIS — K52.832 LYMPHOCYTIC COLITIS: ICD-10-CM

## 2023-01-06 PROCEDURE — 99213 OFFICE O/P EST LOW 20 MIN: CPT | Performed by: INTERNAL MEDICINE

## 2023-01-06 RX ORDER — AMLODIPINE BESYLATE 10 MG/1
TABLET ORAL
Qty: 90 | Status: ACTIVE | COMMUNITY

## 2023-01-06 RX ORDER — FOLIC ACID 1 MG/1
TAKE 1 TABLET (1 MG) BY ORAL ROUTE ONCE DAILY TABLET ORAL 1
Qty: 0 | Refills: 0 | Status: ACTIVE | COMMUNITY
Start: 1900-01-01

## 2023-01-06 RX ORDER — SERTRALINE 100 MG/1
TAKE 1 TABLET (100 MG) BY ORAL ROUTE ONCE DAILY FOR THE 2 WEEKS BEFORE THE ONSET OF MENSES TABLET, FILM COATED ORAL 1
Qty: 0 | Refills: 0 | Status: ACTIVE | COMMUNITY
Start: 1900-01-01

## 2023-01-06 RX ORDER — HYALURONATE SODIUM 0.2 %
APPLY TO AFFECTED AREA(S) CREAM (GRAM) TOPICAL
Qty: 1 | Refills: 0 | Status: ACTIVE | COMMUNITY
Start: 1900-01-01

## 2023-01-06 RX ORDER — TRAVOPROST 0.04 MG/ML
INSTILL 1 DROP IN BOTH EYES EVERY MORNING SOLUTION/ DROPS OPHTHALMIC
Qty: 7.5 | Refills: 1 | Status: ACTIVE | COMMUNITY

## 2023-01-06 RX ORDER — DOXEPIN HYDROCHLORIDE 10 MG/1
TAKE 1 CAPSULE BY ORAL ROUTE AS NEEDED CAPSULE ORAL
Qty: 0 | Refills: 0 | Status: ACTIVE | COMMUNITY
Start: 1900-01-01

## 2023-01-06 RX ORDER — GABAPENTIN 300 MG/1
TAKE 1 CAPSULE (300 MG) BY ORAL ROUTE ONCE PER DAY CAPSULE ORAL
Qty: 0 | Refills: 0 | Status: ACTIVE | COMMUNITY
Start: 1900-01-01

## 2023-01-06 RX ORDER — PREDNISONE 5 MG/1
TAKE 1 TABLET (5 MG) BY ORAL ROUTE ONCE DAILY TABLET ORAL 1
Qty: 0 | Refills: 0 | Status: ACTIVE | COMMUNITY
Start: 1900-01-01

## 2023-01-06 RX ORDER — DUTASTERIDE 0.5 MG/1
TAKE 1 CAPSULE (0.5 MG) BY ORAL ROUTE ONCE DAILY CAPSULE, LIQUID FILLED ORAL 1
Qty: 0 | Refills: 0 | Status: ACTIVE | COMMUNITY
Start: 1900-01-01

## 2023-01-06 RX ORDER — CARVEDILOL 3.12 MG/1
TAKE ONE TABLET BY MOUTH EVERY 12 HOURS TABLET, FILM COATED ORAL
Qty: 60 | Refills: 0 | Status: ACTIVE | COMMUNITY

## 2023-01-06 RX ORDER — TACROLIMUS 1 MG/1
6MG IN THE AM AND 7MG IN THE PM CAPSULE, GELATIN COATED ORAL
Qty: 0 | Refills: 0 | Status: ACTIVE | COMMUNITY
Start: 1900-01-01

## 2023-01-06 RX ORDER — MIRTAZAPINE 7.5 MG/1
TAKE 1 TABLET (7.5 MG) BY ORAL ROUTE ONCE DAILY AT BEDTIME TABLET ORAL 1
Qty: 0 | Refills: 0 | Status: ACTIVE | COMMUNITY
Start: 1900-01-01

## 2023-01-06 RX ORDER — AZATHIOPRINE 50 1/1
TAKE 1 TABLET (50 MG) BY ORAL ROUTE ONCE DAILY TABLET ORAL 1
Qty: 0 | Refills: 0 | Status: ACTIVE | COMMUNITY
Start: 1900-01-01

## 2023-01-06 NOTE — HPI-TODAY'S VISIT:
PAST MEDICAL HISTORY: The patient is a /White male, who presents in follow-up for lymphocytic colitis and erosive gastritis noted on his EGD on 2/27/17. Patient has a history of arthritis, peritoneal dialysis (no longer on), knee replacement in October 2015, and inguinal/umbilical hernia repair 2/2017 by Dr. Ponce. Tramadol helped with his arthritis pain. He was seen in the [facility] ED 2/1/17 because of left groin pain. A left inguinal hernia was reduced. A CT in the ED noted some gastric wall thickening. He had stopped taking Tramadol.   His regimen consisted of Entocort 3 mg daily (1/9/17 Entocort resumed at 9 mg daily and decreased to 6 mg 2/6/17, decreased to 3 mg daily on 4/14/17) and Welchol 625 mg, 3 pills daily with lunch. He has been off the omeprazole 20 mg daily and denies any upper GI issues.   On clinic visit 7/14/17, pt reported intermittent watery urgent stools on a daily basis x 2 weeks. Since then he was started on low dose Imodium 1 pill a day along with budesonide to 3 mg daily. On visit 11/22/17 he stated he was having 3 BMs daily with last one being loose. On 3/2/18, he stated his BMs have improved with 3 BMs daily and noted his stools had become less formed throughout the day. He was on budesonide 3 mg daily and WelChol 3 pills daily. He would use Imodium PRN. He was not able to try the Lomotil. On 5/15/18 he noted he was having formed/solid BMs on Welchol 3 pills daily during the week, budesonide 3 mg daily, and Lomotil 1 pill daily.  On 8/16/18, patient continued on budesonide 3 mg daily and increased to BID because of watery diarrhea.  He discontinued Welchol and continued on Lomotil 1 pill a day.  On this regimen he had a solid BM early AM; the second was formed; the third was liquid.  He stated since his last visit he had a couple of bad falls.  He was taking Naltrexone and had nausea/vomiting/abdominal pain so he stopped.  He stated he had significantly decreased his alcohol use.  He noted a significant decrease in his appetite.  On 10/4/18, the patient reported having nausea along with a headache about twice a week for a few hours which was relieved with ondansetron.  However, he had lost his bottle of medication so he had not used it recently.  The patient noted having 2 BMs per day with formed stool while on Lomotil 1 pill BID. The patient was not clear if he is taking the omeprazole 40 mg QD.  He reported he has continued budesonide 3 mg, 2 pills daily.  After his clinic visit he called back and noted he had misplaced his omeprazole bottle and had not been taking it for some time.  He resumed it.  On 10/04/2018, he stated his nausea and headaches have resolved.  He had not had any diarrhea since his last visit except on 10/04/2018.  He was usually having 2 good formed BMs/day.  He was taking budesonide 3 mg, 2 pills daily and Lomotil 1 pill BID.  On 11/29/18, the patient noted he was having regular bowel habits with formed BM. He was currently on Lomotil 1 pill BID and Budesonide 3 mg 2 pills daily.  He denied any acid reflux symptoms on omeprazole 20 mg daily.  He fell again 2 weeks prior prompting an ED visit.  He suffered bruising to left forearm.   He had an x-ray in the ED.  On 6/6/19, he noted having a renal transplant at [place] on 1/20/19.  He was on azathioprine 50 mg daily, prednisone 5 mg daily, Prograf 5 mg AM and 6 mg PM.  He was also on B12.  He notes Dr. Joaquin noted a decrease in his hemoglobin recently.  The patient denied any blood/melena per rectum.  He was off budesonide and did not take an anti-diarrheal.  He was having a daily formed BM and can 2x/week have a second BM later in the day that is watery.  He also notes urgency.  On 7/18/19, he was following up after an EGD/Colonoscopy.  He continued on B12 daily.  He was having a formed BM 2x/day on Imodium 1 pill daily.  His colonoscopy was negative for microscopic colitis.  He had 1-2 alcohol beverages a month.  On 10/28/19, he said he d/c valacyclovir. He was now taking mirtazapine for depression. His BMs were formed 90% of the time. He had 1-2 BMs/day. He took Imodium 1 pill QD. He had been noting intermittent lower abdominal discomfort in the past month. He had it 3-4x/week and it lasted for about an hour. He described it as an urge to have a BM, but didn't. He denied cramping, bloating, and distention. He d/c omeprazole.   He had 1 beer/day. He would have 1-2 glasses of wine when guests were over. He typically had guests 2x/week. He said he did not have a good appetite. He was trying to eat more. Before his kidney transplant, he was 140 lbs. Afterwards, he dropped down to 130 lbs and said he couldn't manage to go over 135 lbs. He was not drinking Ensure or Boost. He said his depression had gotten worse, so he went to a psychiatrist who adjusted his mirtazapine dosage.   On 12/5/19, he said his creatinine had been elevated to around 1.9 (previously 1.5) and tomorrow he had a biopsy with a transplant team. He d/c the appeitite stimulant/megestrol 400 mg daily after taking for 2-3 weeks. His appetite had improved. He was eating a lot. He put on 5-6 lbs. He had only taken hyoscyamine 0.125 mg once for abdominal discomfort and it worked within 30 min. He still took Imodium 1 pill QD. He said it was effective. He only drank socially and had up to 2 glasses of wine.  On 6/17/21, he said his bowel habit was fine. He took Imodium 1 pill QD. He had 1-2 formed BMs/day. He had not been experiencing reflux symptoms or abdominal pain. His appetite was adequate. He was off vit B12. He continued on iron.  He had 2 alcohol drinks/night on the weekends and 1-2 drinks/night 1x during the week but was trying to hold on use during the week. He had COVID in Dec and lost 20 lbs during that time and was subsequently put on Marinol by his PCP. He had recent labwork with his PCP.  On 5/6/22, he said he was on medication for anxiety and depression which worked. He had COVID last year and lost 30 lbs as a result. He was put on Marinol to stimulate his appetite and gained 10 lbs, but it was difficult for him to keep his weight up. Dr. Joaquin ordered a CT today. He followed with a nutritionist - 1x/2 weeks - increasing caloric intake. He was not drinking any supplements, but had planned to start drinking Kefir. He was currently 138-139 lbs. He had taken Marinol for 2-3 weeks and his weight increased to 145 lbs.  He noted Marinol caused him to get lost in his own home so he would take it before bed.  He "vaguely" remembered taking Megace, but did not remember the benefit.   He would see Dr. Joaquin next month. Most recent labs were 2 weeks ago. Alcohol use was now only on weekends - 2 glasses of wine on Friday and 2 glasses on Saturday. Recent liver enzyme levels were normal he stated. He was not on B12 supplementation or omeprazole.  On 7/8/22, he said he had been on Megace 400 mg daily for about 5 weeks - some improvement. He worked with a nutritionist. He wanted to get back above 140 lbs. He had been having urgent BMs - now taking Imodium 1 pill BID which helped. After the colonoscopy, he would have a good BM earlier in the day followed by urgent BMs later. He had 4-5 BMs QD that is watery 1-2x/day.  On 7/19/22, he said his bowel habit was unchanged following the bowel prep. His BMs started out as "strips" then became "balls" later in the day. His BM frequency bothered him. Before Miralax, he had urgent, liquidy stools. Currently, he was taking Miralax 0.75 cap BID. He continued on Megace and was eating more.  On 8/19/22, he said his bowel habit was unchanged currently on Miralax 1 cap QD - at 7-7:30 am he had a solid BM followed by another BM an hour later that was occasionally solid followed by 3-5 very loose, watery BMs throughout the rest of the day. He had never decreased his Miralax dosage.   He said his appetite was "medium" - not using stimulant - and he noted some weight gain.  On 10/28/22, he said that while prepping for his colon the week prior to his colon, his BM frequency decreased. Following his colon, his freq was still decreased - 1 BM in morning, another BM in the morning that was soft/liquidy and another BM in the afternoon that was liquid. For the past week, his last 2 BMs have been more liquidy he stated. Before starting on colon prep, he was taking Miralax 0.5 cap QD. He was back to the 0.5 cap QD - felt like his BMs were improved on this dose. He was not taking Imodium.  On 12/2/22, he said he had 2 BMs in the mornings that was first formed then was looser. He had 2 more BMs in the afternoon that were not well-formed or are liquidy. He was now on Miralax 0.5 cap QOD.   He had an ultrasound done (Dr. Joaquin) for occasional RUQ subcostal pain. US was normal. He only had pain when he was lying on the floor doing stretches and exercises.  HPI: Today, he says his first BM is formed and solid, but the next one later in the day is somewhat watery. He noticed an improvement off Miralax. In the morning prior to getting up from bed, he feels a left sided subcostal discomfort that is relieved with a BM.  Labs 5/21/21 - COVID positive. CBC normal except hgb 12.8, .2. CMP normal except BUN 30, creatinine 1.46, GFR 47. Chol 252, , non . 7/22/19 - CMP normal except BUN 29, creatinine 1.48, sodium 134, GFR 47. CBC normal except hgb 9.8, .2. Ferritin 344.40, TIBC 246, iron sat 55%, UIBC 110, transferrin 198, vitamin B-12 1253. Folate normal. 5/10/19 Hgb 9.4, .2, B12 > 2000, Folate 10.

## 2023-01-30 NOTE — PHYSICAL EXAM HENT:
Called patient x 2 to schedule sleep study, no answer, LMOM Head, normocephalic, atraumatic, Face, Face within normal limits, Ears, External ears within normal limits

## 2023-02-10 ENCOUNTER — OFFICE VISIT (OUTPATIENT)
Dept: URBAN - METROPOLITAN AREA TELEHEALTH 2 | Facility: TELEHEALTH | Age: 78
End: 2023-02-10
Payer: COMMERCIAL

## 2023-02-10 VITALS — HEIGHT: 66 IN | BODY MASS INDEX: 23.3 KG/M2 | WEIGHT: 145 LBS

## 2023-02-10 DIAGNOSIS — F10.10 ALCOHOL ABUSE: ICD-10-CM

## 2023-02-10 DIAGNOSIS — R10.11 RUQ ABDOMINAL PAIN: ICD-10-CM

## 2023-02-10 DIAGNOSIS — R10.9 ABDOMINAL PAIN, UNSPECIFIED ABDOMINAL LOCATION: ICD-10-CM

## 2023-02-10 DIAGNOSIS — R19.7 ACUTE DIARRHEA: ICD-10-CM

## 2023-02-10 DIAGNOSIS — M19.90 ARTHRITIS: ICD-10-CM

## 2023-02-10 DIAGNOSIS — Z86.010 HISTORY OF COLON POLYPS: ICD-10-CM

## 2023-02-10 DIAGNOSIS — F32.9 DEPRESSION, UNSPECIFIED DEPRESSION TYPE: ICD-10-CM

## 2023-02-10 DIAGNOSIS — K29.60 EROSIVE GASTRITIS: ICD-10-CM

## 2023-02-10 DIAGNOSIS — E53.8 VITAMIN B12 DEFICIENCY: ICD-10-CM

## 2023-02-10 DIAGNOSIS — R63.0 ANOREXIA: ICD-10-CM

## 2023-02-10 DIAGNOSIS — Z94.0 RENAL TRANSPLANT RECIPIENT: ICD-10-CM

## 2023-02-10 DIAGNOSIS — D53.9 MACROCYTIC ANEMIA: ICD-10-CM

## 2023-02-10 PROCEDURE — 99213 OFFICE O/P EST LOW 20 MIN: CPT | Performed by: INTERNAL MEDICINE

## 2023-02-10 RX ORDER — AMLODIPINE BESYLATE 10 MG/1
TABLET ORAL
Qty: 90 | Status: ACTIVE | COMMUNITY

## 2023-02-10 RX ORDER — DUTASTERIDE 0.5 MG/1
TAKE 1 CAPSULE (0.5 MG) BY ORAL ROUTE ONCE DAILY CAPSULE, LIQUID FILLED ORAL 1
Qty: 0 | Refills: 0 | Status: ACTIVE | COMMUNITY
Start: 1900-01-01

## 2023-02-10 RX ORDER — GABAPENTIN 300 MG/1
TAKE 1 CAPSULE (300 MG) BY ORAL ROUTE ONCE PER DAY CAPSULE ORAL
Qty: 0 | Refills: 0 | Status: ACTIVE | COMMUNITY
Start: 1900-01-01

## 2023-02-10 RX ORDER — HYALURONATE SODIUM 0.2 %
APPLY TO AFFECTED AREA(S) CREAM (GRAM) TOPICAL
Qty: 1 | Refills: 0 | Status: ACTIVE | COMMUNITY
Start: 1900-01-01

## 2023-02-10 RX ORDER — CARVEDILOL 3.12 MG/1
TAKE ONE TABLET BY MOUTH EVERY 12 HOURS TABLET, FILM COATED ORAL
Qty: 60 | Refills: 0 | Status: ACTIVE | COMMUNITY

## 2023-02-10 RX ORDER — PREDNISONE 5 MG/1
TAKE 1 TABLET (5 MG) BY ORAL ROUTE ONCE DAILY TABLET ORAL 1
Qty: 0 | Refills: 0 | Status: ACTIVE | COMMUNITY
Start: 1900-01-01

## 2023-02-10 RX ORDER — TACROLIMUS 1 MG/1
6MG IN THE AM AND 7MG IN THE PM CAPSULE, GELATIN COATED ORAL
Qty: 0 | Refills: 0 | Status: ACTIVE | COMMUNITY
Start: 1900-01-01

## 2023-02-10 RX ORDER — SERTRALINE 100 MG/1
TAKE 1 TABLET (100 MG) BY ORAL ROUTE ONCE DAILY FOR THE 2 WEEKS BEFORE THE ONSET OF MENSES TABLET, FILM COATED ORAL 1
Qty: 0 | Refills: 0 | Status: ACTIVE | COMMUNITY
Start: 1900-01-01

## 2023-02-10 RX ORDER — AZATHIOPRINE 50 1/1
TAKE 1 TABLET (50 MG) BY ORAL ROUTE ONCE DAILY TABLET ORAL 1
Qty: 0 | Refills: 0 | Status: ACTIVE | COMMUNITY
Start: 1900-01-01

## 2023-02-10 RX ORDER — TRAVOPROST 0.04 MG/ML
INSTILL 1 DROP IN BOTH EYES EVERY MORNING SOLUTION/ DROPS OPHTHALMIC
Qty: 7.5 | Refills: 1 | Status: ACTIVE | COMMUNITY

## 2023-02-10 RX ORDER — FOLIC ACID 1 MG/1
TAKE 1 TABLET (1 MG) BY ORAL ROUTE ONCE DAILY TABLET ORAL 1
Qty: 0 | Refills: 0 | Status: ACTIVE | COMMUNITY
Start: 1900-01-01

## 2023-02-10 RX ORDER — MIRTAZAPINE 7.5 MG/1
TAKE 1 TABLET (7.5 MG) BY ORAL ROUTE ONCE DAILY AT BEDTIME TABLET ORAL 1
Qty: 0 | Refills: 0 | Status: ACTIVE | COMMUNITY
Start: 1900-01-01

## 2023-02-10 RX ORDER — DOXEPIN HYDROCHLORIDE 10 MG/1
TAKE 1 CAPSULE BY ORAL ROUTE AS NEEDED CAPSULE ORAL
Qty: 0 | Refills: 0 | Status: ACTIVE | COMMUNITY
Start: 1900-01-01

## 2023-02-10 NOTE — HPI-TODAY'S VISIT:
PAST MEDICAL HISTORY: The patient is a /White male, who presents in follow-up for lymphocytic colitis and erosive gastritis noted on his EGD on 2/27/17. Patient has a history of arthritis, peritoneal dialysis (no longer on), knee replacement in October 2015, and inguinal/umbilical hernia repair 2/2017 by Dr. Ponce. Tramadol helped with his arthritis pain. He was seen in the [facility] ED 2/1/17 because of left groin pain. A left inguinal hernia was reduced. A CT in the ED noted some gastric wall thickening. He had stopped taking Tramadol.   His regimen consisted of Entocort 3 mg daily (1/9/17 Entocort resumed at 9 mg daily and decreased to 6 mg 2/6/17, decreased to 3 mg daily on 4/14/17) and Welchol 625 mg, 3 pills daily with lunch. He has been off the omeprazole 20 mg daily and denies any upper GI issues.   On clinic visit 7/14/17, pt reported intermittent watery urgent stools on a daily basis x 2 weeks. Since then he was started on low dose Imodium 1 pill a day along with budesonide to 3 mg daily. On visit 11/22/17 he stated he was having 3 BMs daily with last one being loose. On 3/2/18, he stated his BMs have improved with 3 BMs daily and noted his stools had become less formed throughout the day. He was on budesonide 3 mg daily and WelChol 3 pills daily. He would use Imodium PRN. He was not able to try the Lomotil. On 5/15/18 he noted he was having formed/solid BMs on Welchol 3 pills daily during the week, budesonide 3 mg daily, and Lomotil 1 pill daily.  On 8/16/18, patient continued on budesonide 3 mg daily and increased to BID because of watery diarrhea.  He discontinued Welchol and continued on Lomotil 1 pill a day.  On this regimen he had a solid BM early AM; the second was formed; the third was liquid.  He stated since his last visit he had a couple of bad falls.  He was taking Naltrexone and had nausea/vomiting/abdominal pain so he stopped.  He stated he had significantly decreased his alcohol use.  He noted a significant decrease in his appetite.  On 10/4/18, the patient reported having nausea along with a headache about twice a week for a few hours which was relieved with ondansetron.  However, he had lost his bottle of medication so he had not used it recently.  The patient noted having 2 BMs per day with formed stool while on Lomotil 1 pill BID. The patient was not clear if he is taking the omeprazole 40 mg QD.  He reported he has continued budesonide 3 mg, 2 pills daily.  After his clinic visit he called back and noted he had misplaced his omeprazole bottle and had not been taking it for some time.  He resumed it.  On 10/04/2018, he stated his nausea and headaches have resolved.  He had not had any diarrhea since his last visit except on 10/04/2018.  He was usually having 2 good formed BMs/day.  He was taking budesonide 3 mg, 2 pills daily and Lomotil 1 pill BID.  On 11/29/18, the patient noted he was having regular bowel habits with formed BM. He was currently on Lomotil 1 pill BID and Budesonide 3 mg 2 pills daily.  He denied any acid reflux symptoms on omeprazole 20 mg daily.  He fell again 2 weeks prior prompting an ED visit.  He suffered bruising to left forearm.   He had an x-ray in the ED.  On 6/6/19, he noted having a renal transplant at [place] on 1/20/19.  He was on azathioprine 50 mg daily, prednisone 5 mg daily, Prograf 5 mg AM and 6 mg PM.  He was also on B12.  He notes Dr. Joaquin noted a decrease in his hemoglobin recently.  The patient denied any blood/melena per rectum.  He was off budesonide and did not take an anti-diarrheal.  He was having a daily formed BM and can 2x/week have a second BM later in the day that is watery.  He also notes urgency.  On 7/18/19, he was following up after an EGD/Colonoscopy.  He continued on B12 daily.  He was having a formed BM 2x/day on Imodium 1 pill daily.  His colonoscopy was negative for microscopic colitis.  He had 1-2 alcohol beverages a month.  On 10/28/19, he said he d/c valacyclovir. He was now taking mirtazapine for depression. His BMs were formed 90% of the time. He had 1-2 BMs/day. He took Imodium 1 pill QD. He had been noting intermittent lower abdominal discomfort in the past month. He had it 3-4x/week and it lasted for about an hour. He described it as an urge to have a BM, but didn't. He denied cramping, bloating, and distention. He d/c omeprazole.   He had 1 beer/day. He would have 1-2 glasses of wine when guests were over. He typically had guests 2x/week. He said he did not have a good appetite. He was trying to eat more. Before his kidney transplant, he was 140 lbs. Afterwards, he dropped down to 130 lbs and said he couldn't manage to go over 135 lbs. He was not drinking Ensure or Boost. He said his depression had gotten worse, so he went to a psychiatrist who adjusted his mirtazapine dosage.   On 12/5/19, he said his creatinine had been elevated to around 1.9 (previously 1.5) and tomorrow he had a biopsy with a transplant team. He d/c the appeitite stimulant/megestrol 400 mg daily after taking for 2-3 weeks. His appetite had improved. He was eating a lot. He put on 5-6 lbs. He had only taken hyoscyamine 0.125 mg once for abdominal discomfort and it worked within 30 min. He still took Imodium 1 pill QD. He said it was effective. He only drank socially and had up to 2 glasses of wine.  On 6/17/21, he said his bowel habit was fine. He took Imodium 1 pill QD. He had 1-2 formed BMs/day. He had not been experiencing reflux symptoms or abdominal pain. His appetite was adequate. He was off vit B12. He continued on iron.  He had 2 alcohol drinks/night on the weekends and 1-2 drinks/night 1x during the week but was trying to hold on use during the week. He had COVID in Dec and lost 20 lbs during that time and was subsequently put on Marinol by his PCP. He had recent labwork with his PCP.  On 5/6/22, he said he was on medication for anxiety and depression which worked. He had COVID last year and lost 30 lbs as a result. He was put on Marinol to stimulate his appetite and gained 10 lbs, but it was difficult for him to keep his weight up. Dr. Joaquin ordered a CT today. He followed with a nutritionist - 1x/2 weeks - increasing caloric intake. He was not drinking any supplements, but had planned to start drinking Kefir. He was currently 138-139 lbs. He had taken Marinol for 2-3 weeks and his weight increased to 145 lbs.  He noted Marinol caused him to get lost in his own home so he would take it before bed.  He "vaguely" remembered taking Megace, but did not remember the benefit.   He would see Dr. Joaquin next month. Most recent labs were 2 weeks ago. Alcohol use was now only on weekends - 2 glasses of wine on Friday and 2 glasses on Saturday. Recent liver enzyme levels were normal he stated. He was not on B12 supplementation or omeprazole.  On 7/8/22, he said he had been on Megace 400 mg daily for about 5 weeks - some improvement. He worked with a nutritionist. He wanted to get back above 140 lbs. He had been having urgent BMs - now taking Imodium 1 pill BID which helped. After the colonoscopy, he would have a good BM earlier in the day followed by urgent BMs later. He had 4-5 BMs QD that is watery 1-2x/day.  On 7/19/22, he said his bowel habit was unchanged following the bowel prep. His BMs started out as "strips" then became "balls" later in the day. His BM frequency bothered him. Before Miralax, he had urgent, liquidy stools. Currently, he was taking Miralax 0.75 cap BID. He continued on Megace and was eating more.  On 8/19/22, he said his bowel habit was unchanged currently on Miralax 1 cap QD - at 7-7:30 am he had a solid BM followed by another BM an hour later that was occasionally solid followed by 3-5 very loose, watery BMs throughout the rest of the day. He had never decreased his Miralax dosage.   He said his appetite was "medium" - not using stimulant - and he noted some weight gain.  On 10/28/22, he said that while prepping for his colon the week prior to his colon, his BM frequency decreased. Following his colon, his freq was still decreased - 1 BM in morning, another BM in the morning that was soft/liquidy and another BM in the afternoon that was liquid. For the past week, his last 2 BMs have been more liquidy he stated. Before starting on colon prep, he was taking Miralax 0.5 cap QD. He was back to the 0.5 cap QD - felt like his BMs were improved on this dose. He was not taking Imodium.  On 12/2/22, he said he had 2 BMs in the mornings that was first formed then was looser. He had 2 more BMs in the afternoon that were not well-formed or are liquidy. He was now on Miralax 0.5 cap QOD.   He had an ultrasound done (Dr. Joaquin) for occasional RUQ subcostal pain. US was normal. He only had pain when he was lying on the floor doing stretches and exercises.  On 1/6/23, he said his first BM was formed and solid, but the next one later in the day was somewhat watery. He noticed an improvement off Miralax. In the morning prior to getting up from bed, he felt a left sided subcostal discomfort that was relieved with a BM.  HPI: Today, he says he is using 1 tbsp fiber powder BID. His BMs are usually solid - AM BM always solid, PM BM mostly solid. He associates his left-sided pain with working out, so he has cut back on the weights.  Labs 5/21/21 - COVID positive. CBC normal except hgb 12.8, .2. CMP normal except BUN 30, creatinine 1.46, GFR 47. Chol 252, , non . 7/22/19 - CMP normal except BUN 29, creatinine 1.48, sodium 134, GFR 47. CBC normal except hgb 9.8, .2. Ferritin 344.40, TIBC 246, iron sat 55%, UIBC 110, transferrin 198, vitamin B-12 1253. Folate normal. 5/10/19 Hgb 9.4, .2, B12 > 2000, Folate 10.

## 2023-02-12 PROBLEM — 35489007: Status: ACTIVE | Noted: 2021-06-17

## 2023-02-12 PROBLEM — 161665007: Status: ACTIVE | Noted: 2022-05-06

## 2023-03-01 ENCOUNTER — TELEPHONE ENCOUNTER (OUTPATIENT)
Dept: URBAN - METROPOLITAN AREA CLINIC 105 | Facility: CLINIC | Age: 78
End: 2023-03-01

## 2023-03-08 ENCOUNTER — TELEPHONE ENCOUNTER (OUTPATIENT)
Dept: URBAN - METROPOLITAN AREA CLINIC 105 | Facility: CLINIC | Age: 78
End: 2023-03-08

## 2023-05-05 ENCOUNTER — TELEPHONE ENCOUNTER (OUTPATIENT)
Dept: URBAN - METROPOLITAN AREA CLINIC 105 | Facility: CLINIC | Age: 78
End: 2023-05-05

## 2023-05-08 ENCOUNTER — CLAIMS CREATED FROM THE CLAIM WINDOW (OUTPATIENT)
Dept: URBAN - METROPOLITAN AREA MEDICAL CENTER 33 | Facility: MEDICAL CENTER | Age: 78
End: 2023-05-08
Payer: COMMERCIAL

## 2023-05-08 DIAGNOSIS — R93.3 ABN FINDINGS-GI TRACT: ICD-10-CM

## 2023-05-08 DIAGNOSIS — R10.32 ABDOMINAL CRAMPING IN LEFT LOWER QUADRANT: ICD-10-CM

## 2023-05-08 DIAGNOSIS — R11.2 ACUTE NAUSEA WITH NONBILIOUS VOMITING: ICD-10-CM

## 2023-05-08 DIAGNOSIS — A04.5 CAMPYLOBACTER ANTIGEN POSITIVE: ICD-10-CM

## 2023-05-08 DIAGNOSIS — R93.3 ABNORMAL FINDINGS ON DIAGNOSTIC IMAGING OF OTHER PARTS OF DIGESTIVE TRACT: ICD-10-CM

## 2023-05-08 DIAGNOSIS — R10.12 ABDOMINAL BURNING SENSATION IN LEFT UPPER QUADRANT: ICD-10-CM

## 2023-05-08 DIAGNOSIS — R11.2 NAUSEA WITH VOMITING, UNSPECIFIED: ICD-10-CM

## 2023-05-08 DIAGNOSIS — R93.2 ABNORMAL FINDINGS ON DIAGNOSTIC IMAGING OF LIVER AND BILIARY TRACT: ICD-10-CM

## 2023-05-08 DIAGNOSIS — R10.9 UNSPECIFIED ABDOMINAL PAIN: ICD-10-CM

## 2023-05-08 PROCEDURE — 99233 SBSQ HOSP IP/OBS HIGH 50: CPT | Performed by: INTERNAL MEDICINE

## 2023-05-08 PROCEDURE — G8427 DOCREV CUR MEDS BY ELIG CLIN: HCPCS | Performed by: INTERNAL MEDICINE

## 2023-05-08 PROCEDURE — 99223 1ST HOSP IP/OBS HIGH 75: CPT | Performed by: INTERNAL MEDICINE

## 2023-05-09 ENCOUNTER — CLAIMS CREATED FROM THE CLAIM WINDOW (OUTPATIENT)
Dept: URBAN - METROPOLITAN AREA MEDICAL CENTER 33 | Facility: MEDICAL CENTER | Age: 78
End: 2023-05-09
Payer: COMMERCIAL

## 2023-05-09 ENCOUNTER — CLAIMS CREATED FROM THE CLAIM WINDOW (OUTPATIENT)
Dept: URBAN - METROPOLITAN AREA MEDICAL CENTER 33 | Facility: MEDICAL CENTER | Age: 78
End: 2023-05-09

## 2023-05-09 DIAGNOSIS — R93.3 ABN FINDINGS-GI TRACT: ICD-10-CM

## 2023-05-09 DIAGNOSIS — A04.5 CAMPYLOBACTER ANTIGEN POSITIVE: ICD-10-CM

## 2023-05-09 DIAGNOSIS — R11.2 ACUTE NAUSEA WITH NONBILIOUS VOMITING: ICD-10-CM

## 2023-05-09 DIAGNOSIS — R19.7 ACUTE DIARRHEA: ICD-10-CM

## 2023-05-09 PROCEDURE — 99232 SBSQ HOSP IP/OBS MODERATE 35: CPT | Performed by: INTERNAL MEDICINE

## 2023-05-19 ENCOUNTER — OFFICE VISIT (OUTPATIENT)
Dept: URBAN - METROPOLITAN AREA TELEHEALTH 2 | Facility: TELEHEALTH | Age: 78
End: 2023-05-19
Payer: COMMERCIAL

## 2023-05-19 VITALS — WEIGHT: 145 LBS | BODY MASS INDEX: 23.3 KG/M2 | HEIGHT: 66 IN

## 2023-05-19 DIAGNOSIS — K29.60 EROSIVE GASTRITIS: ICD-10-CM

## 2023-05-19 DIAGNOSIS — K52.832 LYMPHOCYTIC COLITIS: ICD-10-CM

## 2023-05-19 DIAGNOSIS — R19.7 DIARRHEA, UNSPECIFIED TYPE: ICD-10-CM

## 2023-05-19 DIAGNOSIS — R63.0 ANOREXIA: ICD-10-CM

## 2023-05-19 PROBLEM — 3723001: Status: ACTIVE | Noted: 2021-06-17

## 2023-05-19 PROCEDURE — 99213 OFFICE O/P EST LOW 20 MIN: CPT | Performed by: INTERNAL MEDICINE

## 2023-05-19 RX ORDER — DUTASTERIDE 0.5 MG/1
TAKE 1 CAPSULE (0.5 MG) BY ORAL ROUTE ONCE DAILY CAPSULE, LIQUID FILLED ORAL 1
Qty: 0 | Refills: 0 | Status: ACTIVE | COMMUNITY
Start: 1900-01-01

## 2023-05-19 RX ORDER — PREDNISONE 5 MG/1
TAKE 1 TABLET (5 MG) BY ORAL ROUTE ONCE DAILY TABLET ORAL 1
Qty: 0 | Refills: 0 | Status: ACTIVE | COMMUNITY
Start: 1900-01-01

## 2023-05-19 RX ORDER — MIRTAZAPINE 7.5 MG/1
TAKE 1 TABLET (7.5 MG) BY ORAL ROUTE ONCE DAILY AT BEDTIME TABLET ORAL 1
Qty: 0 | Refills: 0 | Status: ACTIVE | COMMUNITY
Start: 1900-01-01

## 2023-05-19 RX ORDER — DOXEPIN HYDROCHLORIDE 10 MG/1
TAKE 1 CAPSULE BY ORAL ROUTE AS NEEDED CAPSULE ORAL
Qty: 0 | Refills: 0 | Status: ACTIVE | COMMUNITY
Start: 1900-01-01

## 2023-05-19 RX ORDER — AZATHIOPRINE 50 1/1
TAKE 1 TABLET (50 MG) BY ORAL ROUTE ONCE DAILY TABLET ORAL 1
Qty: 0 | Refills: 0 | Status: ACTIVE | COMMUNITY
Start: 1900-01-01

## 2023-05-19 RX ORDER — CARVEDILOL 3.12 MG/1
TAKE ONE TABLET BY MOUTH EVERY 12 HOURS TABLET, FILM COATED ORAL
Qty: 60 | Refills: 0 | Status: ACTIVE | COMMUNITY

## 2023-05-19 RX ORDER — HYALURONATE SODIUM 0.2 %
APPLY TO AFFECTED AREA(S) CREAM (GRAM) TOPICAL
Qty: 1 | Refills: 0 | Status: ACTIVE | COMMUNITY
Start: 1900-01-01

## 2023-05-19 RX ORDER — TACROLIMUS 1 MG/1
6MG IN THE AM AND 7MG IN THE PM CAPSULE, GELATIN COATED ORAL
Qty: 0 | Refills: 0 | Status: ACTIVE | COMMUNITY
Start: 1900-01-01

## 2023-05-19 RX ORDER — SERTRALINE 100 MG/1
TAKE 1 TABLET (100 MG) BY ORAL ROUTE ONCE DAILY FOR THE 2 WEEKS BEFORE THE ONSET OF MENSES TABLET, FILM COATED ORAL 1
Qty: 0 | Refills: 0 | Status: ACTIVE | COMMUNITY
Start: 1900-01-01

## 2023-05-19 RX ORDER — FOLIC ACID 1 MG/1
TAKE 1 TABLET (1 MG) BY ORAL ROUTE ONCE DAILY TABLET ORAL 1
Qty: 0 | Refills: 0 | Status: ACTIVE | COMMUNITY
Start: 1900-01-01

## 2023-05-19 RX ORDER — GABAPENTIN 300 MG/1
TAKE 1 CAPSULE (300 MG) BY ORAL ROUTE ONCE PER DAY CAPSULE ORAL
Qty: 0 | Refills: 0 | Status: ACTIVE | COMMUNITY
Start: 1900-01-01

## 2023-05-19 RX ORDER — TRAVOPROST 0.04 MG/ML
INSTILL 1 DROP IN BOTH EYES EVERY MORNING SOLUTION/ DROPS OPHTHALMIC
Qty: 7.5 | Refills: 1 | Status: ACTIVE | COMMUNITY

## 2023-05-19 RX ORDER — AMLODIPINE BESYLATE 10 MG/1
TABLET ORAL
Qty: 90 | Status: ACTIVE | COMMUNITY

## 2023-05-19 NOTE — HPI-TODAY'S VISIT:
PREVIOUS VISITS THIS YEAR: On 1/6/23, he said his first BM was formed and solid, but the next one later in the day was somewhat watery. He noticed an improvement off Miralax. In the morning prior to getting up from bed, he felt a left sided subcostal discomfort that was relieved with a BM.  On 2/10/23, he said he was using 1 tbsp fiber powder BID. His BMs were usually solid - AM BM always solid, PM BM mostly solid. He associated his left-sided pain with working out, so he had cut back on the weights.  HPI: Patient presents today after a recent hospital stay at [facility] from 5/7-5/9/23.  He presented to [facility] in the evening of 5/7/23 after developing acute nausea/vomiting/abdominal pain.  CT A/P w/o contrast on 5/7/23 noted diffuse colon wall thickening and stool throughout the colon.  I saw him early in the day on 5/8/23 in consultation - at that time he denied diarrhea to suggest he actually had colitis.  He noted left sided abdominal pain and was tender on exam.  Given his history of diverticulosis and a tortuous left colon with significant tenderness in the left abdomen, I was concerned for diverticulitis.  His CT noted alot of stool throughout the colon as well which I was unsure if this was also contributing to his discomfort.  I recommended continuing the antibiotic started on admit i.e. Levaquin and starting Amitiza 24 mcg BID.  He then began to have ongoing stools and a stool study was sent later in the day on 5/8 which was positive for Campylobacter.   When I saw him on 5/9 his left sided abdominal pain had improved.  I stopped Amitiza because of his frequent stools overnight.  I recommended to continue a course of Levaquin.    Today, he says he is doing well on Metamucil, now increased to TID. He has 2-3 BMs/day that are formed 90% of the time. No abdominal pain since his hospital visit. No nausea. He is not taking antacid medication currently. He has a small appetite - eats 2.5 meals/day. He has lost several pounds since his hospital visit earlier this month, but prior his weight was stable.  Labs 5/9/23 - CMP normal except BUN 28, creatinine 1.4, albumin 3.3, AST 11. 5/8/23 - CBC normal except 11.6. BMP normal except sodium 135, BUN 32, creatinine 1.38. 5/7/23 - CMP normal except BUN 34, creatinine 1.46. Lipase normal. 5/21/21 - COVID positive. CBC normal except hgb 12.8, .2. CMP normal except BUN 30, creatinine 1.46, GFR 47. Chol 252, , non . 7/22/19 - CMP normal except BUN 29, creatinine 1.48, sodium 134, GFR 47. CBC normal except hgb 9.8, .2. Ferritin 344.40, TIBC 246, iron sat 55%, UIBC 110, transferrin 198, vitamin B-12 1253. Folate normal. 5/10/19 Hgb 9.4, .2, B12 > 2000, Folate 10.

## 2023-05-20 PROBLEM — 428283002: Status: ACTIVE | Noted: 2021-06-17

## 2023-08-23 ENCOUNTER — OFFICE VISIT (OUTPATIENT)
Dept: URBAN - METROPOLITAN AREA CLINIC 105 | Facility: CLINIC | Age: 78
End: 2023-08-23
Payer: COMMERCIAL

## 2023-08-23 ENCOUNTER — LAB OUTSIDE AN ENCOUNTER (OUTPATIENT)
Dept: URBAN - METROPOLITAN AREA CLINIC 105 | Facility: CLINIC | Age: 78
End: 2023-08-23

## 2023-08-23 VITALS
SYSTOLIC BLOOD PRESSURE: 142 MMHG | BODY MASS INDEX: 23.01 KG/M2 | WEIGHT: 143.2 LBS | TEMPERATURE: 97.9 F | DIASTOLIC BLOOD PRESSURE: 71 MMHG | HEART RATE: 64 BPM | HEIGHT: 66 IN

## 2023-08-23 DIAGNOSIS — R63.0 ANOREXIA: ICD-10-CM

## 2023-08-23 DIAGNOSIS — R19.7 DIARRHEA, UNSPECIFIED TYPE: ICD-10-CM

## 2023-08-23 DIAGNOSIS — Z86.010 HISTORY OF COLON POLYPS: ICD-10-CM

## 2023-08-23 DIAGNOSIS — K52.832 LYMPHOCYTIC COLITIS: ICD-10-CM

## 2023-08-23 PROCEDURE — 99214 OFFICE O/P EST MOD 30 MIN: CPT | Performed by: INTERNAL MEDICINE

## 2023-08-23 RX ORDER — MIRTAZAPINE 7.5 MG/1
TAKE 1 TABLET (7.5 MG) BY ORAL ROUTE ONCE DAILY AT BEDTIME TABLET ORAL 1
Qty: 0 | Refills: 0 | Status: ACTIVE | COMMUNITY
Start: 1900-01-01

## 2023-08-23 RX ORDER — DUTASTERIDE 0.5 MG/1
TAKE 1 CAPSULE (0.5 MG) BY ORAL ROUTE ONCE DAILY CAPSULE, LIQUID FILLED ORAL 1
Qty: 0 | Refills: 0 | Status: ACTIVE | COMMUNITY
Start: 1900-01-01

## 2023-08-23 RX ORDER — MEGESTROL ACETATE 400 MG/10ML
10 ML SUSPENSION ORAL TWICE A DAY
Qty: 600 ML | Refills: 5 | OUTPATIENT
Start: 2023-08-23 | End: 2024-02-18

## 2023-08-23 RX ORDER — PREDNISONE 5 MG/1
TAKE 1 TABLET (5 MG) BY ORAL ROUTE ONCE DAILY TABLET ORAL 1
Qty: 0 | Refills: 0 | Status: ACTIVE | COMMUNITY
Start: 1900-01-01

## 2023-08-23 RX ORDER — OMEPRAZOLE 40 MG/1
1 CAPSULE 30 MINUTES BEFORE MORNING MEAL CAPSULE, DELAYED RELEASE ORAL ONCE A DAY
Qty: 90 | Refills: 1 | OUTPATIENT
Start: 2023-08-23

## 2023-08-23 RX ORDER — TRAVOPROST 0.04 MG/ML
INSTILL 1 DROP IN BOTH EYES EVERY MORNING SOLUTION/ DROPS OPHTHALMIC
Qty: 7.5 | Refills: 1 | Status: ACTIVE | COMMUNITY

## 2023-08-23 RX ORDER — FOLIC ACID 1 MG/1
TAKE 1 TABLET (1 MG) BY ORAL ROUTE ONCE DAILY TABLET ORAL 1
Qty: 0 | Refills: 0 | Status: ACTIVE | COMMUNITY
Start: 1900-01-01

## 2023-08-23 RX ORDER — SERTRALINE 100 MG/1
TAKE 1 TABLET (100 MG) BY ORAL ROUTE ONCE DAILY FOR THE 2 WEEKS BEFORE THE ONSET OF MENSES TABLET, FILM COATED ORAL 1
Qty: 0 | Refills: 0 | Status: ACTIVE | COMMUNITY
Start: 1900-01-01

## 2023-08-23 RX ORDER — AMLODIPINE BESYLATE 10 MG/1
TABLET ORAL
Qty: 90 | Status: ACTIVE | COMMUNITY

## 2023-08-23 RX ORDER — TACROLIMUS 1 MG/1
6MG IN THE AM AND 7MG IN THE PM CAPSULE, GELATIN COATED ORAL
Qty: 0 | Refills: 0 | Status: ACTIVE | COMMUNITY
Start: 1900-01-01

## 2023-08-23 RX ORDER — DOXEPIN HYDROCHLORIDE 10 MG/1
TAKE 1 CAPSULE BY ORAL ROUTE AS NEEDED CAPSULE ORAL
Qty: 0 | Refills: 0 | Status: ACTIVE | COMMUNITY
Start: 1900-01-01

## 2023-08-23 RX ORDER — CARVEDILOL 3.12 MG/1
TAKE ONE TABLET BY MOUTH EVERY 12 HOURS TABLET, FILM COATED ORAL
Qty: 60 | Refills: 0 | Status: ACTIVE | COMMUNITY

## 2023-08-23 RX ORDER — AZATHIOPRINE 50 1/1
TAKE 1 TABLET (50 MG) BY ORAL ROUTE ONCE DAILY TABLET ORAL 1
Qty: 0 | Refills: 0 | Status: ACTIVE | COMMUNITY
Start: 1900-01-01

## 2023-08-23 RX ORDER — GABAPENTIN 300 MG/1
TAKE 1 CAPSULE (300 MG) BY ORAL ROUTE ONCE PER DAY CAPSULE ORAL
Qty: 0 | Refills: 0 | Status: ON HOLD | COMMUNITY
Start: 1900-01-01

## 2023-08-23 RX ORDER — HYALURONATE SODIUM 0.2 %
APPLY TO AFFECTED AREA(S) CREAM (GRAM) TOPICAL
Qty: 1 | Refills: 0 | Status: ON HOLD | COMMUNITY
Start: 1900-01-01

## 2023-08-23 NOTE — HPI-TODAY'S VISIT:
PREVIOUS VISITS THIS YEAR: On 1/6/23, he said his first BM was formed and solid, but the next one later in the day was somewhat watery. He noticed an improvement off Miralax. In the morning prior to getting up from bed, he felt a left sided subcostal discomfort that was relieved with a BM.  On 2/10/23, he said he was using 1 tbsp fiber powder BID. His BMs were usually solid - AM BM always solid, PM BM mostly solid. He associated his left-sided pain with working out, so he had cut back on the weights.  Patient presented On 5/19/23 after a recent hospital stay at [facility] from 5/7-5/9/23.  He presented to [facility] in the evening of 5/7/23 after developing acute nausea/vomiting/abdominal pain.  CT A/P w/o contrast on 5/7/23 noted diffuse colon wall thickening and stool throughout the colon.  I saw him early in the day on 5/8/23 in consultation - at that time he denied diarrhea to suggest he actually had colitis.  He noted left sided abdominal pain and was tender on exam.  Given his history of diverticulosis and a tortuous left colon with significant tenderness in the left abdomen, I was concerned for diverticulitis.  His CT noted a lot of stool throughout the colon as well which I was unsure if this was also contributing to his discomfort.  I recommended continuing the antibiotic started on admit i.e. Levaquin and starting Amitiza 24 mcg BID.  He then began to have ongoing stools and a stool study was sent later in the day on 5/8 which was positive for Campylobacter.   When I saw him on 5/9 his left sided abdominal pain had improved.  I stopped Amitiza because of his frequent stools overnight.  I recommended to continue a course of Levaquin.    On 5/19/23, he said he was doing well on Metamucil, now increased to TID. He had 2-3 BMs/day that were formed 90% of the time. No abdominal pain since his hospital visit. No nausea. He was not taking antacid medication currently. He had a small appetite - ate 2.5 meals/day. He had lost several pounds since his hospital visit earlier this month, but prior his weight was stable.  HPI: Today, he says bowel habit is good if he takes Metamucil regularly. He has 2-3 mostly formed/soft BMs/day with good evacuation. He discussed with Dr. Joaquin his difficulty gaining weight. He has resumed Megace 10 mL BID a week ago without a benefit yet for his appetite. In last 1-2 months he has had 3-4 episodes of upset stomach, vomiting. His discomfort starts in the mid chest and he vomits when it gets worse.  Dr. Joaquin did labs 2 days ago he states.   Labs 5/9/23 - CMP normal except BUN 28, creatinine 1.4, albumin 3.3, AST 11. 5/8/23 - CBC normal except 11.6. BMP normal except sodium 135, BUN 32, creatinine 1.38. 5/7/23 - CMP normal except BUN 34, creatinine 1.46. Lipase normal. 5/21/21 - COVID positive. CBC normal except hgb 12.8, .2. CMP normal except BUN 30, creatinine 1.46, GFR 47. Chol 252, , non . 7/22/19 - CMP normal except BUN 29, creatinine 1.48, sodium 134, GFR 47. CBC normal except hgb 9.8, .2. Ferritin 344.40, TIBC 246, iron sat 55%, UIBC 110, transferrin 198, vitamin B-12 1253. Folate normal. 5/10/19 Hgb 9.4, .2, B12 > 2000, Folate 10.

## 2023-09-01 ENCOUNTER — LAB OUTSIDE AN ENCOUNTER (OUTPATIENT)
Dept: URBAN - METROPOLITAN AREA CLINIC 105 | Facility: CLINIC | Age: 78
End: 2023-09-01

## 2023-09-01 ENCOUNTER — TELEPHONE ENCOUNTER (OUTPATIENT)
Dept: URBAN - METROPOLITAN AREA CLINIC 105 | Facility: CLINIC | Age: 78
End: 2023-09-01

## 2023-09-01 ENCOUNTER — OFFICE VISIT (OUTPATIENT)
Dept: URBAN - METROPOLITAN AREA MEDICAL CENTER 33 | Facility: MEDICAL CENTER | Age: 78
End: 2023-09-01
Payer: COMMERCIAL

## 2023-09-01 DIAGNOSIS — R63.0 ALMOST NO APPETITE: ICD-10-CM

## 2023-09-01 DIAGNOSIS — R11.2 ACUTE NAUSEA WITH NONBILIOUS VOMITING: ICD-10-CM

## 2023-09-01 PROCEDURE — 43239 EGD BIOPSY SINGLE/MULTIPLE: CPT | Performed by: INTERNAL MEDICINE

## 2023-09-01 RX ORDER — SERTRALINE 100 MG/1
TAKE 1 TABLET (100 MG) BY ORAL ROUTE ONCE DAILY FOR THE 2 WEEKS BEFORE THE ONSET OF MENSES TABLET, FILM COATED ORAL 1
Qty: 0 | Refills: 0 | Status: ACTIVE | COMMUNITY
Start: 1900-01-01

## 2023-09-01 RX ORDER — AMLODIPINE BESYLATE 10 MG/1
TABLET ORAL
Qty: 90 | Status: ACTIVE | COMMUNITY

## 2023-09-01 RX ORDER — OMEPRAZOLE 40 MG/1
1 CAPSULE 30 MINUTES BEFORE MORNING MEAL CAPSULE, DELAYED RELEASE ORAL ONCE A DAY
Qty: 90 | Refills: 1 | Status: ACTIVE | COMMUNITY
Start: 2023-08-23

## 2023-09-01 RX ORDER — MIRTAZAPINE 7.5 MG/1
TAKE 1 TABLET (7.5 MG) BY ORAL ROUTE ONCE DAILY AT BEDTIME TABLET ORAL 1
Qty: 0 | Refills: 0 | Status: ACTIVE | COMMUNITY
Start: 1900-01-01

## 2023-09-01 RX ORDER — PREDNISONE 5 MG/1
TAKE 1 TABLET (5 MG) BY ORAL ROUTE ONCE DAILY TABLET ORAL 1
Qty: 0 | Refills: 0 | Status: ACTIVE | COMMUNITY
Start: 1900-01-01

## 2023-09-01 RX ORDER — AZATHIOPRINE 50 1/1
TAKE 1 TABLET (50 MG) BY ORAL ROUTE ONCE DAILY TABLET ORAL 1
Qty: 0 | Refills: 0 | Status: ACTIVE | COMMUNITY
Start: 1900-01-01

## 2023-09-01 RX ORDER — CARVEDILOL 3.12 MG/1
TAKE ONE TABLET BY MOUTH EVERY 12 HOURS TABLET, FILM COATED ORAL
Qty: 60 | Refills: 0 | Status: ACTIVE | COMMUNITY

## 2023-09-01 RX ORDER — FOLIC ACID 1 MG/1
TAKE 1 TABLET (1 MG) BY ORAL ROUTE ONCE DAILY TABLET ORAL 1
Qty: 0 | Refills: 0 | Status: ACTIVE | COMMUNITY
Start: 1900-01-01

## 2023-09-01 RX ORDER — TRAVOPROST 0.04 MG/ML
INSTILL 1 DROP IN BOTH EYES EVERY MORNING SOLUTION/ DROPS OPHTHALMIC
Qty: 7.5 | Refills: 1 | Status: ACTIVE | COMMUNITY

## 2023-09-01 RX ORDER — HYALURONATE SODIUM 0.2 %
APPLY TO AFFECTED AREA(S) CREAM (GRAM) TOPICAL
Qty: 1 | Refills: 0 | Status: ON HOLD | COMMUNITY
Start: 1900-01-01

## 2023-09-01 RX ORDER — MEGESTROL ACETATE 400 MG/10ML
10 ML SUSPENSION ORAL TWICE A DAY
Qty: 600 ML | Refills: 5 | Status: ACTIVE | COMMUNITY
Start: 2023-08-23 | End: 2024-02-18

## 2023-09-01 RX ORDER — DOXEPIN HYDROCHLORIDE 10 MG/1
TAKE 1 CAPSULE BY ORAL ROUTE AS NEEDED CAPSULE ORAL
Qty: 0 | Refills: 0 | Status: ACTIVE | COMMUNITY
Start: 1900-01-01

## 2023-09-01 RX ORDER — TACROLIMUS 1 MG/1
6MG IN THE AM AND 7MG IN THE PM CAPSULE, GELATIN COATED ORAL
Qty: 0 | Refills: 0 | Status: ACTIVE | COMMUNITY
Start: 1900-01-01

## 2023-09-01 RX ORDER — GABAPENTIN 300 MG/1
TAKE 1 CAPSULE (300 MG) BY ORAL ROUTE ONCE PER DAY CAPSULE ORAL
Qty: 0 | Refills: 0 | Status: ON HOLD | COMMUNITY
Start: 1900-01-01

## 2023-09-01 RX ORDER — DUTASTERIDE 0.5 MG/1
TAKE 1 CAPSULE (0.5 MG) BY ORAL ROUTE ONCE DAILY CAPSULE, LIQUID FILLED ORAL 1
Qty: 0 | Refills: 0 | Status: ACTIVE | COMMUNITY
Start: 1900-01-01

## 2023-10-24 ENCOUNTER — OFFICE VISIT (OUTPATIENT)
Dept: URBAN - METROPOLITAN AREA CLINIC 105 | Facility: CLINIC | Age: 78
End: 2023-10-24
Payer: COMMERCIAL

## 2023-10-24 VITALS
DIASTOLIC BLOOD PRESSURE: 75 MMHG | HEART RATE: 56 BPM | TEMPERATURE: 97.2 F | SYSTOLIC BLOOD PRESSURE: 147 MMHG | BODY MASS INDEX: 23.95 KG/M2 | HEIGHT: 66 IN | WEIGHT: 149 LBS

## 2023-10-24 DIAGNOSIS — M19.90 ARTHRITIS: ICD-10-CM

## 2023-10-24 DIAGNOSIS — R19.7 DIARRHEA, UNSPECIFIED TYPE: ICD-10-CM

## 2023-10-24 DIAGNOSIS — K52.832 LYMPHOCYTIC COLITIS: ICD-10-CM

## 2023-10-24 DIAGNOSIS — E53.8 VITAMIN B12 DEFICIENCY: ICD-10-CM

## 2023-10-24 DIAGNOSIS — R11.2 NAUSEA AND VOMITING, UNSPECIFIED VOMITING TYPE: ICD-10-CM

## 2023-10-24 DIAGNOSIS — K29.60 EROSIVE GASTRITIS: ICD-10-CM

## 2023-10-24 DIAGNOSIS — D53.9 MACROCYTIC ANEMIA: ICD-10-CM

## 2023-10-24 DIAGNOSIS — F32.9 DEPRESSION, UNSPECIFIED DEPRESSION TYPE: ICD-10-CM

## 2023-10-24 DIAGNOSIS — R10.9 ABDOMINAL PAIN, UNSPECIFIED ABDOMINAL LOCATION: ICD-10-CM

## 2023-10-24 DIAGNOSIS — Z94.0 RENAL TRANSPLANT RECIPIENT: ICD-10-CM

## 2023-10-24 DIAGNOSIS — R10.11 RUQ ABDOMINAL PAIN: ICD-10-CM

## 2023-10-24 DIAGNOSIS — Z86.010 HISTORY OF COLON POLYPS: ICD-10-CM

## 2023-10-24 DIAGNOSIS — A04.5 CAMPYLOBACTER GASTROENTERITIS: ICD-10-CM

## 2023-10-24 DIAGNOSIS — R63.0 ANOREXIA: ICD-10-CM

## 2023-10-24 DIAGNOSIS — F10.10 ALCOHOL ABUSE: ICD-10-CM

## 2023-10-24 PROBLEM — 15167005: Status: ACTIVE | Noted: 2021-06-17

## 2023-10-24 PROBLEM — 79890006: Status: ACTIVE | Noted: 2021-06-17

## 2023-10-24 PROCEDURE — 99213 OFFICE O/P EST LOW 20 MIN: CPT | Performed by: INTERNAL MEDICINE

## 2023-10-24 RX ORDER — OMEPRAZOLE 40 MG/1
1 CAPSULE 30 MINUTES BEFORE MORNING MEAL CAPSULE, DELAYED RELEASE ORAL ONCE A DAY
Qty: 90 | Refills: 1 | Status: ACTIVE | COMMUNITY
Start: 2023-08-23

## 2023-10-24 RX ORDER — CARVEDILOL 3.12 MG/1
TAKE ONE TABLET BY MOUTH EVERY 12 HOURS TABLET, FILM COATED ORAL
Qty: 60 | Refills: 0 | Status: ACTIVE | COMMUNITY

## 2023-10-24 RX ORDER — OMEPRAZOLE 20 MG/1
1 CAPSULE 30 MINUTES BEFORE MORNING MEAL CAPSULE, DELAYED RELEASE ORAL ONCE A DAY
Qty: 90 | Refills: 1 | OUTPATIENT
Start: 2023-08-23

## 2023-10-24 RX ORDER — TACROLIMUS 1 MG/1
6MG IN THE AM AND 7MG IN THE PM CAPSULE, GELATIN COATED ORAL
Qty: 0 | Refills: 0 | Status: ACTIVE | COMMUNITY
Start: 1900-01-01

## 2023-10-24 RX ORDER — FOLIC ACID 1 MG/1
TAKE 1 TABLET (1 MG) BY ORAL ROUTE ONCE DAILY TABLET ORAL 1
Qty: 0 | Refills: 0 | Status: ACTIVE | COMMUNITY
Start: 1900-01-01

## 2023-10-24 RX ORDER — PREDNISONE 5 MG/1
TAKE 1 TABLET (5 MG) BY ORAL ROUTE ONCE DAILY TABLET ORAL 1
Qty: 0 | Refills: 0 | Status: ACTIVE | COMMUNITY
Start: 1900-01-01

## 2023-10-24 RX ORDER — MIRTAZAPINE 7.5 MG/1
TAKE 1 TABLET (7.5 MG) BY ORAL ROUTE ONCE DAILY AT BEDTIME TABLET ORAL 1
Qty: 0 | Refills: 0 | Status: ACTIVE | COMMUNITY
Start: 1900-01-01

## 2023-10-24 RX ORDER — AZATHIOPRINE 50 1/1
TAKE 1 TABLET (50 MG) BY ORAL ROUTE ONCE DAILY TABLET ORAL 1
Qty: 0 | Refills: 0 | Status: ACTIVE | COMMUNITY
Start: 1900-01-01

## 2023-10-24 RX ORDER — DOXEPIN HYDROCHLORIDE 10 MG/1
TAKE 1 CAPSULE BY ORAL ROUTE AS NEEDED CAPSULE ORAL
Qty: 0 | Refills: 0 | Status: ACTIVE | COMMUNITY
Start: 1900-01-01

## 2023-10-24 RX ORDER — DUTASTERIDE 0.5 MG/1
TAKE 1 CAPSULE (0.5 MG) BY ORAL ROUTE ONCE DAILY CAPSULE, LIQUID FILLED ORAL 1
Qty: 0 | Refills: 0 | Status: ACTIVE | COMMUNITY
Start: 1900-01-01

## 2023-10-24 RX ORDER — TRAVOPROST 0.04 MG/ML
INSTILL 1 DROP IN BOTH EYES EVERY MORNING SOLUTION/ DROPS OPHTHALMIC
Qty: 7.5 | Refills: 1 | Status: ACTIVE | COMMUNITY

## 2023-10-24 RX ORDER — SERTRALINE 100 MG/1
TAKE 1 TABLET (100 MG) BY ORAL ROUTE ONCE DAILY FOR THE 2 WEEKS BEFORE THE ONSET OF MENSES TABLET, FILM COATED ORAL 1
Qty: 0 | Refills: 0 | Status: ACTIVE | COMMUNITY
Start: 1900-01-01

## 2023-10-24 RX ORDER — AMLODIPINE BESYLATE 10 MG/1
TABLET ORAL
Qty: 90 | Status: ACTIVE | COMMUNITY

## 2023-10-24 NOTE — HPI-TODAY'S VISIT:
PREVIOUS VISITS THIS YEAR: On 1/6/23, he said his first BM was formed and solid, but the next one later in the day was somewhat watery. He noticed an improvement off Miralax. In the morning prior to getting up from bed, he felt a left sided subcostal discomfort that was relieved with a BM.  On 2/10/23, he said he was using 1 tbsp fiber powder BID. His BMs were usually solid - AM BM always solid, PM BM mostly solid. He associated his left-sided pain with working out, so he had cut back on the weights.  Patient presented On 5/19/23 after a recent hospital stay at [facility] from 5/7-5/9/23.  He presented to [facility] in the evening of 5/7/23 after developing acute nausea/vomiting/abdominal pain.  CT A/P w/o contrast on 5/7/23 noted diffuse colon wall thickening and stool throughout the colon.  I saw him early in the day on 5/8/23 in consultation - at that time he denied diarrhea to suggest he actually had colitis.  He noted left sided abdominal pain and was tender on exam.  Given his history of diverticulosis and a tortuous left colon with significant tenderness in the left abdomen, I was concerned for diverticulitis.  His CT noted a lot of stool throughout the colon as well which I was unsure if this was also contributing to his discomfort.  I recommended continuing the antibiotic started on admit i.e. Levaquin and starting Amitiza 24 mcg BID.  He then began to have ongoing stools and a stool study was sent later in the day on 5/8 which was positive for Campylobacter.   When I saw him on 5/9 his left sided abdominal pain had improved.  I stopped Amitiza because of his frequent stools overnight.  I recommended to continue a course of Levaquin.    On 5/19/23, he said he was doing well on Metamucil, now increased to TID. He had 2-3 BMs/day that were formed 90% of the time. No abdominal pain since his hospital visit. No nausea. He was not taking antacid medication currently. He had a small appetite - ate 2.5 meals/day. He had lost several pounds since his hospital visit earlier this month, but prior his weight was stable.  On 8/23/23, he said bowel habit was good if he took Metamucil regularly. He had 2-3 mostly formed/soft BMs/day with good evacuation. He discussed with Dr. Joaquin had difficulty gaining weight. He had resumed Megace 10 mL BID a week ago without a benefit yet for his appetite. In last 1-2 months he had 3-4 episodes of upset stomach, vomiting. His discomfort started in the mid chest and he vomitted when it got worse.  Dr. Joaquin did labs 2 days ago he stated.   HPI: Today, he says he d/c Megace because his appetite increased and gained weight. He took Megace for 6 months and noticedweight gain in the last 30-45 days. He is not drinking as much Boost/Ensure since weight gain. No nausea/vomiting since shortly after starting omeprazole 40 mg QD. His first BM is always good but formation of later BMs depend on whether he is consistent with Metamucil - if he misses a dose he has a looser BM (usually softer). He drinks 1 alcohol beverage in a day, 2-3/week. No abd pain.  Labs 5/9/23 - CMP normal except BUN 28, creatinine 1.4, albumin 3.3, AST 11. 5/8/23 - CBC normal except 11.6. BMP normal except sodium 135, BUN 32, creatinine 1.38. 5/7/23 - CMP normal except BUN 34, creatinine 1.46. Lipase normal. 5/21/21 - COVID positive. CBC normal except hgb 12.8, .2. CMP normal except BUN 30, creatinine 1.46, GFR 47. Chol 252, , non . 7/22/19 - CMP normal except BUN 29, creatinine 1.48, sodium 134, GFR 47. CBC normal except hgb 9.8, .2. Ferritin 344.40, TIBC 246, iron sat 55%, UIBC 110, transferrin 198, vitamin B-12 1253. Folate normal. 5/10/19 Hgb 9.4, .2, B12 > 2000, Folate 10.

## 2024-01-26 ENCOUNTER — OFFICE VISIT (OUTPATIENT)
Dept: URBAN - METROPOLITAN AREA CLINIC 105 | Facility: CLINIC | Age: 79
End: 2024-01-26
Payer: COMMERCIAL

## 2024-01-26 VITALS
HEART RATE: 52 BPM | HEIGHT: 66 IN | DIASTOLIC BLOOD PRESSURE: 74 MMHG | WEIGHT: 148 LBS | BODY MASS INDEX: 23.78 KG/M2 | SYSTOLIC BLOOD PRESSURE: 138 MMHG | TEMPERATURE: 97.5 F

## 2024-01-26 DIAGNOSIS — F32.9 DEPRESSION, UNSPECIFIED DEPRESSION TYPE: ICD-10-CM

## 2024-01-26 DIAGNOSIS — K52.832 LYMPHOCYTIC COLITIS: ICD-10-CM

## 2024-01-26 DIAGNOSIS — R19.7 DIARRHEA, UNSPECIFIED TYPE: ICD-10-CM

## 2024-01-26 DIAGNOSIS — A04.5 CAMPYLOBACTER GASTROENTERITIS: ICD-10-CM

## 2024-01-26 DIAGNOSIS — R11.2 NAUSEA AND VOMITING, UNSPECIFIED VOMITING TYPE: ICD-10-CM

## 2024-01-26 DIAGNOSIS — R10.11 RUQ ABDOMINAL PAIN: ICD-10-CM

## 2024-01-26 DIAGNOSIS — E53.8 VITAMIN B12 DEFICIENCY: ICD-10-CM

## 2024-01-26 DIAGNOSIS — F10.10 ALCOHOL ABUSE: ICD-10-CM

## 2024-01-26 DIAGNOSIS — D53.9 MACROCYTIC ANEMIA: ICD-10-CM

## 2024-01-26 DIAGNOSIS — Z86.010 HISTORY OF COLON POLYPS: ICD-10-CM

## 2024-01-26 DIAGNOSIS — M19.90 ARTHRITIS: ICD-10-CM

## 2024-01-26 DIAGNOSIS — K29.60 EROSIVE GASTRITIS: ICD-10-CM

## 2024-01-26 DIAGNOSIS — R10.9 ABDOMINAL PAIN, UNSPECIFIED ABDOMINAL LOCATION: ICD-10-CM

## 2024-01-26 DIAGNOSIS — Z94.0 RENAL TRANSPLANT RECIPIENT: ICD-10-CM

## 2024-01-26 DIAGNOSIS — R63.0 ANOREXIA: ICD-10-CM

## 2024-01-26 PROCEDURE — 99214 OFFICE O/P EST MOD 30 MIN: CPT | Performed by: INTERNAL MEDICINE

## 2024-01-26 RX ORDER — ROSUVASTATIN CALCIUM 5 MG/1
1 TABLET TABLET, COATED ORAL ONCE A DAY
Status: ACTIVE | COMMUNITY

## 2024-01-26 RX ORDER — FOLIC ACID 1 MG/1
TAKE 1 TABLET (1 MG) BY ORAL ROUTE ONCE DAILY TABLET ORAL 1
Qty: 0 | Refills: 0 | Status: ACTIVE | COMMUNITY
Start: 1900-01-01

## 2024-01-26 RX ORDER — MIRTAZAPINE 7.5 MG/1
TAKE 1 TABLET (7.5 MG) BY ORAL ROUTE ONCE DAILY AT BEDTIME TABLET ORAL 1
Qty: 0 | Refills: 0 | Status: ACTIVE | COMMUNITY
Start: 1900-01-01

## 2024-01-26 RX ORDER — SERTRALINE 100 MG/1
TAKE 1 TABLET (100 MG) BY ORAL ROUTE ONCE DAILY FOR THE 2 WEEKS BEFORE THE ONSET OF MENSES TABLET, FILM COATED ORAL 1
Qty: 0 | Refills: 0 | Status: ACTIVE | COMMUNITY
Start: 1900-01-01

## 2024-01-26 RX ORDER — AMLODIPINE BESYLATE 10 MG/1
TABLET ORAL
Qty: 90 | Status: ACTIVE | COMMUNITY

## 2024-01-26 RX ORDER — TACROLIMUS 1 MG/1
6MG IN THE AM AND 7MG IN THE PM CAPSULE, GELATIN COATED ORAL
Qty: 0 | Refills: 0 | Status: ACTIVE | COMMUNITY
Start: 1900-01-01

## 2024-01-26 RX ORDER — OMEPRAZOLE 20 MG/1
1 CAPSULE 30 MINUTES BEFORE MORNING MEAL CAPSULE, DELAYED RELEASE ORAL ONCE A DAY
Qty: 90 | Refills: 1 | Status: ACTIVE | COMMUNITY
Start: 2023-08-23

## 2024-01-26 RX ORDER — AZATHIOPRINE 50 1/1
TAKE 1 TABLET (50 MG) BY ORAL ROUTE ONCE DAILY TABLET ORAL 1
Qty: 0 | Refills: 0 | Status: ACTIVE | COMMUNITY
Start: 1900-01-01

## 2024-01-26 RX ORDER — DUTASTERIDE 0.5 MG/1
TAKE 1 CAPSULE (0.5 MG) BY ORAL ROUTE ONCE DAILY CAPSULE, LIQUID FILLED ORAL 1
Qty: 0 | Refills: 0 | Status: ACTIVE | COMMUNITY
Start: 1900-01-01

## 2024-01-26 RX ORDER — OMEPRAZOLE 10 MG/1
1 CAPSULE 30 MINUTES BEFORE MORNING MEAL CAPSULE, DELAYED RELEASE ORAL ONCE A DAY
Qty: 30 | Refills: 1 | OUTPATIENT
Start: 2023-08-23

## 2024-01-26 RX ORDER — DOXEPIN HYDROCHLORIDE 10 MG/1
TAKE 1 CAPSULE BY ORAL ROUTE AS NEEDED CAPSULE ORAL
Qty: 0 | Refills: 0 | Status: ACTIVE | COMMUNITY
Start: 1900-01-01

## 2024-01-26 RX ORDER — PREDNISONE 5 MG/1
TAKE 1 TABLET (5 MG) BY ORAL ROUTE ONCE DAILY TABLET ORAL 1
Qty: 0 | Refills: 0 | Status: ACTIVE | COMMUNITY
Start: 1900-01-01

## 2024-01-26 RX ORDER — TRAVOPROST 0.04 MG/ML
INSTILL 1 DROP IN BOTH EYES EVERY MORNING SOLUTION/ DROPS OPHTHALMIC
Qty: 7.5 | Refills: 1 | Status: ACTIVE | COMMUNITY

## 2024-01-26 RX ORDER — CARVEDILOL 3.12 MG/1
TAKE ONE TABLET BY MOUTH EVERY 12 HOURS TABLET, FILM COATED ORAL
Qty: 60 | Refills: 0 | Status: ACTIVE | COMMUNITY

## 2024-01-26 NOTE — HPI-TODAY'S VISIT:
PREVIOUS VISITS THIS YEAR: On 1/6/23, he said his first BM was formed and solid, but the next one later in the day was somewhat watery. He noticed an improvement off Miralax. In the morning prior to getting up from bed, he felt a left sided subcostal discomfort that was relieved with a BM.  On 2/10/23, he said he was using 1 tbsp fiber powder BID. His BMs were usually solid - AM BM always solid, PM BM mostly solid. He associated his left-sided pain with working out, so he had cut back on the weights.  Patient presented On 5/19/23 after a recent hospital stay at [facility] from 5/7-5/9/23.  He presented to [facility] in the evening of 5/7/23 after developing acute nausea/vomiting/abdominal pain.  CT A/P w/o contrast on 5/7/23 noted diffuse colon wall thickening and stool throughout the colon.  I saw him early in the day on 5/8/23 in consultation - at that time he denied diarrhea to suggest he actually had colitis.  He noted left sided abdominal pain and was tender on exam.  Given his history of diverticulosis and a tortuous left colon with significant tenderness in the left abdomen, I was concerned for diverticulitis.  His CT noted a lot of stool throughout the colon as well which I was unsure if this was also contributing to his discomfort.  I recommended continuing the antibiotic started on admit i.e. Levaquin and starting Amitiza 24 mcg BID.  He then began to have ongoing stools and a stool study was sent later in the day on 5/8 which was positive for Campylobacter.   When I saw him on 5/9 his left sided abdominal pain had improved.  I stopped Amitiza because of his frequent stools overnight.  I recommended to continue a course of Levaquin.    On 5/19/23, he said he was doing well on Metamucil, now increased to TID. He had 2-3 BMs/day that were formed 90% of the time. No abdominal pain since his hospital visit. No nausea. He was not taking antacid medication currently. He had a small appetite - ate 2.5 meals/day. He had lost several pounds since his hospital visit earlier this month, but prior his weight was stable.  On 8/23/23, he said bowel habit was good if he took Metamucil regularly. He had 2-3 mostly formed/soft BMs/day with good evacuation. He discussed with Dr. Joaquin had difficulty gaining weight. He had resumed Megace 10 mL BID a week ago without a benefit yet for his appetite. In last 1-2 months he had 3-4 episodes of upset stomach, vomiting. His discomfort started in the mid chest and he vomitted when it got worse.  Dr. Joaquin did labs 2 days ago he stated.   On 10/24/23, he said he d/c Megace because his appetite increased and gained weight. He took Megace for 6 months and noticed weight gain in the last 30-45 days. He was not drinking as much Boost/Ensure since weight gain. No nausea/vomiting since shortly after starting omeprazole 40 mg QD. His first BM was always good but formation of later BMs depends on whether he was consistent with Metamucil - if he missed a dose he had a looser BM (usually softer). He drank 1 alcohol beverage in a day, 2-3/week. No abd pain.  HPI: Today, he says he decreased omeprazole to 20 mg and is doing well on this lower dose. He says Dr. Florian wants him to go off the omeprazole because it interferes with his kidney medication. 95% of the time he is having solid and regular BMs. Looser stools 1x/month.   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   Labs 5/9/23 - CMP normal except BUN 28, creatinine 1.4, albumin 3.3, AST 11. 5/8/23 - CBC normal except 11.6. BMP normal except sodium 135, BUN 32, creatinine 1.38. 5/7/23 - CMP normal except BUN 34, creatinine 1.46. Lipase normal. 5/21/21 - COVID positive. CBC normal except hgb 12.8, .2. CMP normal except BUN 30, creatinine 1.46, GFR 47. Chol 252, , non . 7/22/19 - CMP normal except BUN 29, creatinine 1.48, sodium 134, GFR 47. CBC normal except hgb 9.8, .2. Ferritin 344.40, TIBC 246, iron sat 55%, UIBC 110, transferrin 198, vitamin B-12 1253. Folate normal. 5/10/19 Hgb 9.4, .2, B12 > 2000, Folate 10.

## 2024-01-27 PROBLEM — 1086791000119100: Status: ACTIVE | Noted: 2021-06-17

## 2024-03-01 ENCOUNTER — OV EP (OUTPATIENT)
Dept: URBAN - METROPOLITAN AREA CLINIC 105 | Facility: CLINIC | Age: 79
End: 2024-03-01
Payer: COMMERCIAL

## 2024-03-01 VITALS
HEART RATE: 60 BPM | HEIGHT: 66 IN | TEMPERATURE: 96.8 F | DIASTOLIC BLOOD PRESSURE: 77 MMHG | WEIGHT: 145.2 LBS | SYSTOLIC BLOOD PRESSURE: 148 MMHG | BODY MASS INDEX: 23.33 KG/M2

## 2024-03-01 DIAGNOSIS — K52.832 LYMPHOCYTIC COLITIS: ICD-10-CM

## 2024-03-01 DIAGNOSIS — R11.2 NAUSEA AND VOMITING, UNSPECIFIED VOMITING TYPE: ICD-10-CM

## 2024-03-01 DIAGNOSIS — R10.11 RUQ ABDOMINAL PAIN: ICD-10-CM

## 2024-03-01 DIAGNOSIS — R19.7 DIARRHEA, UNSPECIFIED TYPE: ICD-10-CM

## 2024-03-01 DIAGNOSIS — Z86.010 HISTORY OF COLON POLYPS: ICD-10-CM

## 2024-03-01 DIAGNOSIS — Z94.0 RENAL TRANSPLANT RECIPIENT: ICD-10-CM

## 2024-03-01 DIAGNOSIS — F32.9 DEPRESSION, UNSPECIFIED DEPRESSION TYPE: ICD-10-CM

## 2024-03-01 DIAGNOSIS — A04.5 CAMPYLOBACTER GASTROENTERITIS: ICD-10-CM

## 2024-03-01 DIAGNOSIS — D53.9 MACROCYTIC ANEMIA: ICD-10-CM

## 2024-03-01 DIAGNOSIS — R10.9 ABDOMINAL PAIN, UNSPECIFIED ABDOMINAL LOCATION: ICD-10-CM

## 2024-03-01 DIAGNOSIS — E53.8 VITAMIN B12 DEFICIENCY: ICD-10-CM

## 2024-03-01 DIAGNOSIS — K29.60 EROSIVE GASTRITIS: ICD-10-CM

## 2024-03-01 DIAGNOSIS — M19.90 ARTHRITIS: ICD-10-CM

## 2024-03-01 DIAGNOSIS — R63.0 ANOREXIA: ICD-10-CM

## 2024-03-01 DIAGNOSIS — F10.10 ALCOHOL ABUSE: ICD-10-CM

## 2024-03-01 PROCEDURE — 99214 OFFICE O/P EST MOD 30 MIN: CPT | Performed by: INTERNAL MEDICINE

## 2024-03-01 RX ORDER — DUTASTERIDE 0.5 MG/1
TAKE 1 CAPSULE (0.5 MG) BY ORAL ROUTE ONCE DAILY CAPSULE, LIQUID FILLED ORAL 1
Qty: 0 | Refills: 0 | Status: ACTIVE | COMMUNITY
Start: 1900-01-01

## 2024-03-01 RX ORDER — TRAVOPROST 0.04 MG/ML
INSTILL 1 DROP IN BOTH EYES EVERY MORNING SOLUTION/ DROPS OPHTHALMIC
Qty: 7.5 | Refills: 1 | Status: ACTIVE | COMMUNITY

## 2024-03-01 RX ORDER — FOLIC ACID 1 MG/1
TAKE 1 TABLET (1 MG) BY ORAL ROUTE ONCE DAILY TABLET ORAL 1
Qty: 0 | Refills: 0 | Status: ACTIVE | COMMUNITY
Start: 1900-01-01

## 2024-03-01 RX ORDER — DOXEPIN HYDROCHLORIDE 10 MG/1
TAKE 1 CAPSULE BY ORAL ROUTE AS NEEDED CAPSULE ORAL
Qty: 0 | Refills: 0 | Status: ACTIVE | COMMUNITY
Start: 1900-01-01

## 2024-03-01 RX ORDER — AMLODIPINE BESYLATE 10 MG/1
TABLET ORAL
Qty: 90 | Status: ACTIVE | COMMUNITY

## 2024-03-01 RX ORDER — OMEPRAZOLE 10 MG/1
1 CAPSULE 30 MINUTES BEFORE MORNING MEAL CAPSULE, DELAYED RELEASE ORAL ONCE A DAY
Qty: 30 | Refills: 1 | Status: ACTIVE | COMMUNITY
Start: 2023-08-23

## 2024-03-01 RX ORDER — MEGESTROL ACETATE 400 MG/10ML
10 ML SUSPENSION ORAL ONCE A DAY
Qty: 300 ML | Refills: 5 | OUTPATIENT
Start: 2024-03-01 | End: 2024-08-28

## 2024-03-01 RX ORDER — FAMOTIDINE 20 MG/1
1 TABLET TABLET, FILM COATED ORAL TWICE A DAY
Qty: 180 TABLET | Refills: 3 | OUTPATIENT
Start: 2024-03-01

## 2024-03-01 RX ORDER — TACROLIMUS 1 MG/1
6MG IN THE AM AND 7MG IN THE PM CAPSULE, GELATIN COATED ORAL
Qty: 0 | Refills: 0 | Status: ACTIVE | COMMUNITY
Start: 1900-01-01

## 2024-03-01 RX ORDER — PREDNISONE 5 MG/1
TAKE 1 TABLET (5 MG) BY ORAL ROUTE ONCE DAILY TABLET ORAL 1
Qty: 0 | Refills: 0 | Status: ACTIVE | COMMUNITY
Start: 1900-01-01

## 2024-03-01 RX ORDER — SERTRALINE 100 MG/1
TAKE 1 TABLET (100 MG) BY ORAL ROUTE ONCE DAILY FOR THE 2 WEEKS BEFORE THE ONSET OF MENSES TABLET, FILM COATED ORAL 1
Qty: 0 | Refills: 0 | Status: ACTIVE | COMMUNITY
Start: 1900-01-01

## 2024-03-01 RX ORDER — CARVEDILOL 3.12 MG/1
TAKE ONE TABLET BY MOUTH EVERY 12 HOURS TABLET, FILM COATED ORAL
Qty: 60 | Refills: 0 | Status: ACTIVE | COMMUNITY

## 2024-03-01 RX ORDER — AZATHIOPRINE 50 1/1
TAKE 1 TABLET (50 MG) BY ORAL ROUTE ONCE DAILY TABLET ORAL 1
Qty: 0 | Refills: 0 | Status: ACTIVE | COMMUNITY
Start: 1900-01-01

## 2024-03-01 RX ORDER — ROSUVASTATIN CALCIUM 5 MG/1
1 TABLET TABLET, COATED ORAL ONCE A DAY
Status: ACTIVE | COMMUNITY

## 2024-03-01 RX ORDER — MIRTAZAPINE 7.5 MG/1
TAKE 1 TABLET (7.5 MG) BY ORAL ROUTE ONCE DAILY AT BEDTIME TABLET ORAL 1
Qty: 0 | Refills: 0 | Status: ACTIVE | COMMUNITY
Start: 1900-01-01

## 2024-03-01 NOTE — HPI-TODAY'S VISIT:
PREVIOUS VISITS THIS YEAR: On 1/6/23, he said his first BM was formed and solid, but the next one later in the day was somewhat watery. He noticed an improvement off Miralax. In the morning prior to getting up from bed, he felt a left sided subcostal discomfort that was relieved with a BM.  On 2/10/23, he said he was using 1 tbsp fiber powder BID. His BMs were usually solid - AM BM always solid, PM BM mostly solid. He associated his left-sided pain with working out, so he had cut back on the weights.  Patient presented On 5/19/23 after a recent hospital stay at [facility] from 5/7-5/9/23.  He presented to [facility] in the evening of 5/7/23 after developing acute nausea/vomiting/abdominal pain.  CT A/P w/o contrast on 5/7/23 noted diffuse colon wall thickening and stool throughout the colon.  I saw him early in the day on 5/8/23 in consultation - at that time he denied diarrhea to suggest he actually had colitis.  He noted left sided abdominal pain and was tender on exam.  Given his history of diverticulosis and a tortuous left colon with significant tenderness in the left abdomen, I was concerned for diverticulitis.  His CT noted a lot of stool throughout the colon as well which I was unsure if this was also contributing to his discomfort.  I recommended continuing the antibiotic started on admit i.e. Levaquin and starting Amitiza 24 mcg BID.  He then began to have ongoing stools and a stool study was sent later in the day on 5/8 which was positive for Campylobacter.   When I saw him on 5/9 his left sided abdominal pain had improved.  I stopped Amitiza because of his frequent stools overnight.  I recommended to continue a course of Levaquin.    On 5/19/23, he said he was doing well on Metamucil, now increased to TID. He had 2-3 BMs/day that were formed 90% of the time. No abdominal pain since his hospital visit. No nausea. He was not taking antacid medication currently. He had a small appetite - ate 2.5 meals/day. He had lost several pounds since his hospital visit earlier this month, but prior his weight was stable.  On 8/23/23, he said bowel habit was good if he took Metamucil regularly. He had 2-3 mostly formed/soft BMs/day with good evacuation. He discussed with Dr. Joaquin had difficulty gaining weight. He had resumed Megace 10 mL BID a week ago without a benefit yet for his appetite. In last 1-2 months he had 3-4 episodes of upset stomach, vomiting. His discomfort started in the mid chest and he vomitted when it got worse.  Dr. Joaquin did labs 2 days ago he stated.   On 10/24/23, he said he d/c Megace because his appetite increased and gained weight. He took Megace for 6 months and noticed weight gain in the last 30-45 days. He was not drinking as much Boost/Ensure since weight gain. No nausea/vomiting since shortly after starting omeprazole 40 mg QD. His first BM was always good but formation of later BMs depends on whether he was consistent with Metamucil - if he missed a dose he had a looser BM (usually softer). He drank 1 alcohol beverage in a day, 2-3/week. No abd pain.  On 1/26/24, he said he decreased omeprazole to 20 mg and was doing well on this lower dose. He said Dr. Florian wanted him to go off the omeprazole because it interfered with his kidney medication. 95% of the time he was having solid and regular BMs. Looser stools 1x/month.   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   HPI: Today, he says he's had no issue with decreasing omeprazole to 10 mg QD. His weight is still not where he wants it to be - not eating enough, does not enjoy food that much anymore, and also thinks he could have more of an appetite. He is off Megace. He already works with a nutritonist. No abdominal pain except for when he exercises. Bowel habit is good - solid 95% of the time.  Labs 5/9/23 - CMP normal except BUN 28, creatinine 1.4, albumin 3.3, AST 11. 5/8/23 - CBC normal except 11.6. BMP normal except sodium 135, BUN 32, creatinine 1.38. 5/7/23 - CMP normal except BUN 34, creatinine 1.46. Lipase normal. 5/21/21 - COVID positive. CBC normal except hgb 12.8, .2. CMP normal except BUN 30, creatinine 1.46, GFR 47. Chol 252, , non . 7/22/19 - CMP normal except BUN 29, creatinine 1.48, sodium 134, GFR 47. CBC normal except hgb 9.8, .2. Ferritin 344.40, TIBC 246, iron sat 55%, UIBC 110, transferrin 198, vitamin B-12 1253. Folate normal. 5/10/19 Hgb 9.4, .2, B12 > 2000, Folate 10.

## 2024-05-31 ENCOUNTER — DASHBOARD ENCOUNTERS (OUTPATIENT)
Age: 79
End: 2024-05-31

## 2024-05-31 ENCOUNTER — OFFICE VISIT (OUTPATIENT)
Dept: URBAN - METROPOLITAN AREA CLINIC 105 | Facility: CLINIC | Age: 79
End: 2024-05-31

## 2024-05-31 VITALS
BODY MASS INDEX: 23.91 KG/M2 | DIASTOLIC BLOOD PRESSURE: 69 MMHG | WEIGHT: 148.8 LBS | SYSTOLIC BLOOD PRESSURE: 134 MMHG | HEIGHT: 66 IN | HEART RATE: 61 BPM | TEMPERATURE: 97 F

## 2024-05-31 RX ORDER — CARVEDILOL 3.12 MG/1
TAKE ONE TABLET BY MOUTH EVERY 12 HOURS TABLET, FILM COATED ORAL
Qty: 60 | Refills: 0 | Status: ACTIVE | COMMUNITY

## 2024-05-31 RX ORDER — PREDNISONE 5 MG/1
TAKE 1 TABLET (5 MG) BY ORAL ROUTE ONCE DAILY TABLET ORAL 1
Qty: 0 | Refills: 0 | Status: ACTIVE | COMMUNITY
Start: 1900-01-01

## 2024-05-31 RX ORDER — MEGESTROL ACETATE 400 MG/10ML
10 ML SUSPENSION ORAL TWICE A DAY
Qty: 600 ML | Refills: 2 | OUTPATIENT
Start: 2024-03-01 | End: 2024-08-29

## 2024-05-31 RX ORDER — OMEPRAZOLE 10 MG/1
1 CAPSULE 30 MINUTES BEFORE MORNING MEAL CAPSULE, DELAYED RELEASE ORAL ONCE A DAY
Qty: 30 | Refills: 1 | Status: ACTIVE | COMMUNITY
Start: 2023-08-23

## 2024-05-31 RX ORDER — AMLODIPINE BESYLATE 10 MG/1
TABLET ORAL
Qty: 90 | Status: ACTIVE | COMMUNITY

## 2024-05-31 RX ORDER — DUTASTERIDE 0.5 MG/1
TAKE 1 CAPSULE (0.5 MG) BY ORAL ROUTE ONCE DAILY CAPSULE, LIQUID FILLED ORAL 1
Qty: 0 | Refills: 0 | Status: ACTIVE | COMMUNITY
Start: 1900-01-01

## 2024-05-31 RX ORDER — FAMOTIDINE 20 MG/1
1 TABLET TABLET, FILM COATED ORAL TWICE A DAY
Qty: 180 TABLET | Refills: 3 | Status: ACTIVE | COMMUNITY
Start: 2024-03-01

## 2024-05-31 RX ORDER — FOLIC ACID 1 MG/1
TAKE 1 TABLET (1 MG) BY ORAL ROUTE ONCE DAILY TABLET ORAL 1
Qty: 0 | Refills: 0 | Status: ACTIVE | COMMUNITY
Start: 1900-01-01

## 2024-05-31 RX ORDER — SERTRALINE 100 MG/1
TAKE 1 TABLET (100 MG) BY ORAL ROUTE ONCE DAILY FOR THE 2 WEEKS BEFORE THE ONSET OF MENSES TABLET, FILM COATED ORAL 1
Qty: 0 | Refills: 0 | Status: ACTIVE | COMMUNITY
Start: 1900-01-01

## 2024-05-31 RX ORDER — TRAVOPROST 0.04 MG/ML
INSTILL 1 DROP IN BOTH EYES EVERY MORNING SOLUTION/ DROPS OPHTHALMIC
Qty: 7.5 | Refills: 1 | Status: ACTIVE | COMMUNITY

## 2024-05-31 RX ORDER — TACROLIMUS 1 MG/1
6MG IN THE AM AND 7MG IN THE PM CAPSULE, GELATIN COATED ORAL
Qty: 0 | Refills: 0 | Status: ACTIVE | COMMUNITY
Start: 1900-01-01

## 2024-05-31 RX ORDER — MEGESTROL ACETATE 400 MG/10ML
10 ML SUSPENSION ORAL ONCE A DAY
Qty: 300 ML | Refills: 5 | Status: ACTIVE | COMMUNITY
Start: 2024-03-01 | End: 2024-08-28

## 2024-05-31 RX ORDER — DOXEPIN HYDROCHLORIDE 10 MG/1
TAKE 1 CAPSULE BY ORAL ROUTE AS NEEDED CAPSULE ORAL
Qty: 0 | Refills: 0 | Status: ACTIVE | COMMUNITY
Start: 1900-01-01

## 2024-05-31 RX ORDER — AZATHIOPRINE 50 1/1
TAKE 1 TABLET (50 MG) BY ORAL ROUTE ONCE DAILY TABLET ORAL 1
Qty: 0 | Refills: 0 | Status: ACTIVE | COMMUNITY
Start: 1900-01-01

## 2024-05-31 RX ORDER — MIRTAZAPINE 7.5 MG/1
TAKE 1 TABLET (7.5 MG) BY ORAL ROUTE ONCE DAILY AT BEDTIME TABLET ORAL 1
Qty: 0 | Refills: 0 | Status: ACTIVE | COMMUNITY
Start: 1900-01-01

## 2024-05-31 RX ORDER — ROSUVASTATIN CALCIUM 5 MG/1
1 TABLET TABLET, COATED ORAL ONCE A DAY
Status: ACTIVE | COMMUNITY

## 2024-06-07 ENCOUNTER — OFFICE VISIT (OUTPATIENT)
Dept: URBAN - METROPOLITAN AREA CLINIC 105 | Facility: CLINIC | Age: 79
End: 2024-06-07

## 2024-08-23 ENCOUNTER — OFFICE VISIT (OUTPATIENT)
Dept: URBAN - METROPOLITAN AREA CLINIC 105 | Facility: CLINIC | Age: 79
End: 2024-08-23
Payer: MEDICARE

## 2024-08-23 VITALS
TEMPERATURE: 97.3 F | SYSTOLIC BLOOD PRESSURE: 117 MMHG | HEIGHT: 66 IN | HEART RATE: 57 BPM | BODY MASS INDEX: 22.85 KG/M2 | WEIGHT: 142.2 LBS | DIASTOLIC BLOOD PRESSURE: 69 MMHG

## 2024-08-23 DIAGNOSIS — F10.10 ALCOHOL ABUSE: ICD-10-CM

## 2024-08-23 DIAGNOSIS — E53.8 VITAMIN B12 DEFICIENCY: ICD-10-CM

## 2024-08-23 DIAGNOSIS — K52.832 LYMPHOCYTIC COLITIS: ICD-10-CM

## 2024-08-23 DIAGNOSIS — M19.90 ARTHRITIS: ICD-10-CM

## 2024-08-23 DIAGNOSIS — R11.2 NAUSEA AND VOMITING, UNSPECIFIED VOMITING TYPE: ICD-10-CM

## 2024-08-23 DIAGNOSIS — R10.11 RUQ ABDOMINAL PAIN: ICD-10-CM

## 2024-08-23 DIAGNOSIS — M48.061 DEGENERATIVE LUMBAR SPINAL STENOSIS: ICD-10-CM

## 2024-08-23 DIAGNOSIS — Z94.0 RENAL TRANSPLANT RECIPIENT: ICD-10-CM

## 2024-08-23 DIAGNOSIS — F32.9 DEPRESSION, UNSPECIFIED DEPRESSION TYPE: ICD-10-CM

## 2024-08-23 DIAGNOSIS — R10.9 ABDOMINAL PAIN, UNSPECIFIED ABDOMINAL LOCATION: ICD-10-CM

## 2024-08-23 DIAGNOSIS — R63.0 ANOREXIA: ICD-10-CM

## 2024-08-23 DIAGNOSIS — R19.7 DIARRHEA, UNSPECIFIED TYPE: ICD-10-CM

## 2024-08-23 DIAGNOSIS — D53.9 MACROCYTIC ANEMIA: ICD-10-CM

## 2024-08-23 DIAGNOSIS — Z86.010 HISTORY OF COLON POLYPS: ICD-10-CM

## 2024-08-23 DIAGNOSIS — A04.5 CAMPYLOBACTER GASTROENTERITIS: ICD-10-CM

## 2024-08-23 DIAGNOSIS — K29.60 EROSIVE GASTRITIS: ICD-10-CM

## 2024-08-23 PROCEDURE — 99214 OFFICE O/P EST MOD 30 MIN: CPT | Performed by: INTERNAL MEDICINE

## 2024-08-23 RX ORDER — SERTRALINE 100 MG/1
TAKE 1 TABLET (100 MG) BY ORAL ROUTE ONCE DAILY FOR THE 2 WEEKS BEFORE THE ONSET OF MENSES TABLET, FILM COATED ORAL 1
Qty: 0 | Refills: 0 | Status: ACTIVE | COMMUNITY
Start: 1900-01-01

## 2024-08-23 RX ORDER — MIRTAZAPINE 7.5 MG/1
TAKE 1 TABLET (7.5 MG) BY ORAL ROUTE ONCE DAILY AT BEDTIME TABLET ORAL 1
Qty: 0 | Refills: 0 | Status: ACTIVE | COMMUNITY
Start: 1900-01-01

## 2024-08-23 RX ORDER — ROSUVASTATIN CALCIUM 5 MG/1
1 TABLET TABLET, COATED ORAL ONCE A DAY
Status: ACTIVE | COMMUNITY

## 2024-08-23 RX ORDER — PREDNISONE 5 MG/1
TAKE 1 TABLET (5 MG) BY ORAL ROUTE ONCE DAILY TABLET ORAL 1
Qty: 0 | Refills: 0 | Status: ACTIVE | COMMUNITY
Start: 1900-01-01

## 2024-08-23 RX ORDER — AMLODIPINE BESYLATE 10 MG/1
TABLET ORAL
Qty: 90 | Status: ACTIVE | COMMUNITY

## 2024-08-23 RX ORDER — DOXEPIN HYDROCHLORIDE 10 MG/1
TAKE 1 CAPSULE BY ORAL ROUTE AS NEEDED CAPSULE ORAL
Qty: 0 | Refills: 0 | Status: ACTIVE | COMMUNITY
Start: 1900-01-01

## 2024-08-23 RX ORDER — MEGESTROL ACETATE 400 MG/10ML
10 ML SUSPENSION ORAL TWICE A DAY
Qty: 600 ML | Refills: 2 | Status: ACTIVE | COMMUNITY
Start: 2024-03-01 | End: 2024-08-29

## 2024-08-23 RX ORDER — DUTASTERIDE 0.5 MG/1
TAKE 1 CAPSULE (0.5 MG) BY ORAL ROUTE ONCE DAILY CAPSULE, LIQUID FILLED ORAL 1
Qty: 0 | Refills: 0 | Status: ACTIVE | COMMUNITY
Start: 1900-01-01

## 2024-08-23 RX ORDER — OMEPRAZOLE 10 MG/1
1 CAPSULE 30 MINUTES BEFORE MORNING MEAL CAPSULE, DELAYED RELEASE ORAL ONCE A DAY
Qty: 30 | Refills: 1 | Status: ACTIVE | COMMUNITY
Start: 2023-08-23

## 2024-08-23 RX ORDER — FOLIC ACID 1 MG/1
TAKE 1 TABLET (1 MG) BY ORAL ROUTE ONCE DAILY TABLET ORAL 1
Qty: 0 | Refills: 0 | Status: ACTIVE | COMMUNITY
Start: 1900-01-01

## 2024-08-23 RX ORDER — CARVEDILOL 3.12 MG/1
TAKE ONE TABLET BY MOUTH EVERY 12 HOURS TABLET, FILM COATED ORAL
Qty: 60 | Refills: 0 | Status: ACTIVE | COMMUNITY

## 2024-08-23 RX ORDER — TACROLIMUS 1 MG/1
6MG IN THE AM AND 7MG IN THE PM CAPSULE, GELATIN COATED ORAL
Qty: 0 | Refills: 0 | Status: ACTIVE | COMMUNITY
Start: 1900-01-01

## 2024-08-23 RX ORDER — TRAVOPROST 0.04 MG/ML
INSTILL 1 DROP IN BOTH EYES EVERY MORNING SOLUTION/ DROPS OPHTHALMIC
Qty: 7.5 | Refills: 1 | Status: ACTIVE | COMMUNITY

## 2024-08-23 RX ORDER — AZATHIOPRINE 50 1/1
TAKE 1 TABLET (50 MG) BY ORAL ROUTE ONCE DAILY TABLET ORAL 1
Qty: 0 | Refills: 0 | Status: ACTIVE | COMMUNITY
Start: 1900-01-01

## 2024-08-23 RX ORDER — FAMOTIDINE 20 MG/1
1 TABLET TABLET, FILM COATED ORAL TWICE A DAY
Qty: 180 TABLET | Refills: 3 | Status: ACTIVE | COMMUNITY
Start: 2024-03-01

## 2024-08-23 NOTE — HPI-TODAY'S VISIT:
PREVIOUS VISITS THIS YEAR: On 1/6/23, he said his first BM was formed and solid, but the next one later in the day was somewhat watery. He noticed an improvement off Miralax. In the morning prior to getting up from bed, he felt a left sided subcostal discomfort that was relieved with a BM.  On 2/10/23, he said he was using 1 tbsp fiber powder BID. His BMs were usually solid - AM BM always solid, PM BM mostly solid. He associated his left-sided pain with working out, so he had cut back on the weights.  Patient presented On 5/19/23 after a recent hospital stay at [facility] from 5/7-5/9/23.  He presented to [facility] in the evening of 5/7/23 after developing acute nausea/vomiting/abdominal pain.  CT A/P w/o contrast on 5/7/23 noted diffuse colon wall thickening and stool throughout the colon.  I saw him early in the day on 5/8/23 in consultation - at that time he denied diarrhea to suggest he actually had colitis.  He noted left sided abdominal pain and was tender on exam.  Given his history of diverticulosis and a tortuous left colon with significant tenderness in the left abdomen, I was concerned for diverticulitis.  His CT noted a lot of stool throughout the colon as well which I was unsure if this was also contributing to his discomfort.  I recommended continuing the antibiotic started on admit i.e. Levaquin and starting Amitiza 24 mcg BID.  He then began to have ongoing stools and a stool study was sent later in the day on 5/8 which was positive for Campylobacter.   When I saw him on 5/9 his left sided abdominal pain had improved.  I stopped Amitiza because of his frequent stools overnight.  I recommended to continue a course of Levaquin.    On 5/19/23, he said he was doing well on Metamucil, now increased to TID. He had 2-3 BMs/day that were formed 90% of the time. No abdominal pain since his hospital visit. No nausea. He was not taking antacid medication currently. He had a small appetite - ate 2.5 meals/day. He had lost several pounds since his hospital visit earlier this month, but prior his weight was stable.  On 8/23/23, he said bowel habit was good if he took Metamucil regularly. He had 2-3 mostly formed/soft BMs/day with good evacuation. He discussed with Dr. Joaquin had difficulty gaining weight. He had resumed Megace 10 mL BID a week ago without a benefit yet for his appetite. In last 1-2 months he had 3-4 episodes of upset stomach, vomiting. His discomfort started in the mid chest and he vomitted when it got worse.  Dr. Joaquin did labs 2 days ago he stated.   On 10/24/23, he said he d/c Megace because his appetite increased and gained weight. He took Megace for 6 months and noticed weight gain in the last 30-45 days. He was not drinking as much Boost/Ensure since weight gain. No nausea/vomiting since shortly after starting omeprazole 40 mg QD. His first BM was always good but formation of later BMs depends on whether he was consistent with Metamucil - if he missed a dose he had a looser BM (usually softer). He drank 1 alcohol beverage in a day, 2-3/week. No abd pain.  On 1/26/24, he said he decreased omeprazole to 20 mg and was doing well on this lower dose. He said Dr. Florian wanted him to go off the omeprazole because it interfered with his kidney medication. 95% of the time he was having solid and regular BMs. Looser stools 1x/month.   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   On 3/1/24, he said he had no issue with decreasing omeprazole to 10 mg QD. His weight was still not where he wanted it to be - not eating enough, did not enjoy food that much anymore, and also thought he could have more of an appetite. He was off Megace. He already worked with a nutritonist. No abdominal pain except for when he exercised. Bowel habit was good - solid 95% of the time.  On 5/31/24, he said he was on Megace 1x/day, but did not notice a difference in appetite. He said he had gained weight (weight about the same from last visit). He switched from omeprazole to famotidine 40 mg QD. He had 3 episodes of epigastic discomfort ("uneasy feeling") that "builds into" nausea/vomiting. He did not notice a significant difference between famotidine and omeprazole. He said Dr. Florian thought omeprazole could affect his kidneys.  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 He will have Moh's surgery for basal cell. He is now retired.  HPI Today, he says he noted no benefit after increasing Megace to BID. He recently gained 10 lbs while on vacation with friends [place], but lost the weight once back here. He thinks he gained weight during the trip because he made the commitment to eat TID and he did not have to cook the meals. He currently has no appetite. He has supplement drinks on-hand, but not taken it yet. Bowel habit is good 75% of the time. He has periodic very loose and thin BMs. He takes Imodium PRN which helps with looser stools. He is taking famotidine QD and notes a benefit on it. He has L4-L5 stenosis and seeing a neurosurgeon.  Labs 5/9/23 - CMP normal except BUN 28, creatinine 1.4, albumin 3.3, AST 11. 5/8/23 - CBC normal except 11.6. BMP normal except sodium 135, BUN 32, creatinine 1.38. 5/7/23 - CMP normal except BUN 34, creatinine 1.46. Lipase normal. 5/21/21 - COVID positive. CBC normal except hgb 12.8, .2. CMP normal except BUN 30, creatinine 1.46, GFR 47. Chol 252, , non . 7/22/19 - CMP normal except BUN 29, creatinine 1.48, sodium 134, GFR 47. CBC normal except hgb 9.8, .2. Ferritin 344.40, TIBC 246, iron sat 55%, UIBC 110, transferrin 198, vitamin B-12 1253. Folate normal. 5/10/19 Hgb 9.4, .2, B12 > 2000, Folate 10.

## 2024-10-02 ENCOUNTER — OFFICE VISIT (OUTPATIENT)
Dept: URBAN - METROPOLITAN AREA CLINIC 105 | Facility: CLINIC | Age: 79
End: 2024-10-02

## 2024-10-02 RX ORDER — FAMOTIDINE 20 MG/1
1 TABLET TABLET, FILM COATED ORAL TWICE A DAY
Qty: 180 TABLET | Refills: 3 | Status: ACTIVE | COMMUNITY
Start: 2024-03-01

## 2024-10-02 RX ORDER — SERTRALINE 100 MG/1
TAKE 1 TABLET (100 MG) BY ORAL ROUTE ONCE DAILY FOR THE 2 WEEKS BEFORE THE ONSET OF MENSES TABLET, FILM COATED ORAL 1
Qty: 0 | Refills: 0 | Status: ACTIVE | COMMUNITY
Start: 1900-01-01

## 2024-10-02 RX ORDER — TRAVOPROST 0.04 MG/ML
INSTILL 1 DROP IN BOTH EYES EVERY MORNING SOLUTION/ DROPS OPHTHALMIC
Qty: 7.5 | Refills: 1 | Status: ACTIVE | COMMUNITY

## 2024-10-02 RX ORDER — AMLODIPINE BESYLATE 10 MG/1
TABLET ORAL
Qty: 90 | Status: ACTIVE | COMMUNITY

## 2024-10-02 RX ORDER — FOLIC ACID 1 MG/1
TAKE 1 TABLET (1 MG) BY ORAL ROUTE ONCE DAILY TABLET ORAL 1
Qty: 0 | Refills: 0 | Status: ACTIVE | COMMUNITY
Start: 1900-01-01

## 2024-10-02 RX ORDER — DUTASTERIDE 0.5 MG/1
TAKE 1 CAPSULE (0.5 MG) BY ORAL ROUTE ONCE DAILY CAPSULE, LIQUID FILLED ORAL 1
Qty: 0 | Refills: 0 | Status: ACTIVE | COMMUNITY
Start: 1900-01-01

## 2024-10-02 RX ORDER — AZATHIOPRINE 50 1/1
TAKE 1 TABLET (50 MG) BY ORAL ROUTE ONCE DAILY TABLET ORAL 1
Qty: 0 | Refills: 0 | Status: ACTIVE | COMMUNITY
Start: 1900-01-01

## 2024-10-02 RX ORDER — TACROLIMUS 1 MG/1
6MG IN THE AM AND 7MG IN THE PM CAPSULE, GELATIN COATED ORAL
Qty: 0 | Refills: 0 | Status: ACTIVE | COMMUNITY
Start: 1900-01-01

## 2024-10-02 RX ORDER — DOXEPIN HYDROCHLORIDE 10 MG/1
TAKE 1 CAPSULE BY ORAL ROUTE AS NEEDED CAPSULE ORAL
Qty: 0 | Refills: 0 | Status: ACTIVE | COMMUNITY
Start: 1900-01-01

## 2024-10-02 RX ORDER — ROSUVASTATIN CALCIUM 5 MG/1
1 TABLET TABLET, COATED ORAL ONCE A DAY
Status: ACTIVE | COMMUNITY

## 2024-10-02 RX ORDER — PREDNISONE 5 MG/1
TAKE 1 TABLET (5 MG) BY ORAL ROUTE ONCE DAILY TABLET ORAL 1
Qty: 0 | Refills: 0 | Status: ACTIVE | COMMUNITY
Start: 1900-01-01

## 2024-10-02 RX ORDER — MIRTAZAPINE 7.5 MG/1
TAKE 1 TABLET (7.5 MG) BY ORAL ROUTE ONCE DAILY AT BEDTIME TABLET ORAL 1
Qty: 0 | Refills: 0 | Status: ACTIVE | COMMUNITY
Start: 1900-01-01

## 2024-10-02 RX ORDER — OMEPRAZOLE 10 MG/1
1 CAPSULE 30 MINUTES BEFORE MORNING MEAL CAPSULE, DELAYED RELEASE ORAL ONCE A DAY
Qty: 30 | Refills: 1 | Status: ACTIVE | COMMUNITY
Start: 2023-08-23

## 2024-10-02 RX ORDER — CARVEDILOL 3.12 MG/1
TAKE ONE TABLET BY MOUTH EVERY 12 HOURS TABLET, FILM COATED ORAL
Qty: 60 | Refills: 0 | Status: ACTIVE | COMMUNITY

## 2024-10-02 NOTE — HPI-TODAY'S VISIT:
PREVIOUS VISITS THIS YEAR: On 1/6/23, he said his first BM was formed and solid, but the next one later in the day was somewhat watery. He noticed an improvement off Miralax. In the morning prior to getting up from bed, he felt a left sided subcostal discomfort that was relieved with a BM.  On 2/10/23, he said he was using 1 tbsp fiber powder BID. His BMs were usually solid - AM BM always solid, PM BM mostly solid. He associated his left-sided pain with working out, so he had cut back on the weights.  Patient presented On 5/19/23 after a recent hospital stay at [facility] from 5/7-5/9/23.  He presented to [facility] in the evening of 5/7/23 after developing acute nausea/vomiting/abdominal pain.  CT A/P w/o contrast on 5/7/23 noted diffuse colon wall thickening and stool throughout the colon.  I saw him early in the day on 5/8/23 in consultation - at that time he denied diarrhea to suggest he actually had colitis.  He noted left sided abdominal pain and was tender on exam.  Given his history of diverticulosis and a tortuous left colon with significant tenderness in the left abdomen, I was concerned for diverticulitis.  His CT noted a lot of stool throughout the colon as well which I was unsure if this was also contributing to his discomfort.  I recommended continuing the antibiotic started on admit i.e. Levaquin and starting Amitiza 24 mcg BID.  He then began to have ongoing stools and a stool study was sent later in the day on 5/8 which was positive for Campylobacter.   When I saw him on 5/9 his left sided abdominal pain had improved.  I stopped Amitiza because of his frequent stools overnight.  I recommended to continue a course of Levaquin.    On 5/19/23, he said he was doing well on Metamucil, now increased to TID. He had 2-3 BMs/day that were formed 90% of the time. No abdominal pain since his hospital visit. No nausea. He was not taking antacid medication currently. He had a small appetite - ate 2.5 meals/day. He had lost several pounds since his hospital visit earlier this month, but prior his weight was stable.  On 8/23/23, he said bowel habit was good if he took Metamucil regularly. He had 2-3 mostly formed/soft BMs/day with good evacuation. He discussed with Dr. Joaquin had difficulty gaining weight. He had resumed Megace 10 mL BID a week ago without a benefit yet for his appetite. In last 1-2 months he had 3-4 episodes of upset stomach, vomiting. His discomfort started in the mid chest and he vomitted when it got worse.  Dr. Joaquin did labs 2 days ago he stated.   On 10/24/23, he said he d/c Megace because his appetite increased and gained weight. He took Megace for 6 months and noticed weight gain in the last 30-45 days. He was not drinking as much Boost/Ensure since weight gain. No nausea/vomiting since shortly after starting omeprazole 40 mg QD. His first BM was always good but formation of later BMs depends on whether he was consistent with Metamucil - if he missed a dose he had a looser BM (usually softer). He drank 1 alcohol beverage in a day, 2-3/week. No abd pain.  On 1/26/24, he said he decreased omeprazole to 20 mg and was doing well on this lower dose. He said Dr. Florian wanted him to go off the omeprazole because it interfered with his kidney medication. 95% of the time he was having solid and regular BMs. Looser stools 1x/month.   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   On 3/1/24, he said he had no issue with decreasing omeprazole to 10 mg QD. His weight was still not where he wanted it to be - not eating enough, did not enjoy food that much anymore, and also thought he could have more of an appetite. He was off Megace. He already worked with a nutritonist. No abdominal pain except for when he exercised. Bowel habit was good - solid 95% of the time.  On 5/31/24, he said he was on Megace 1x/day, but did not notice a difference in appetite. He said he had gained weight (weight about the same from last visit). He switched from omeprazole to famotidine 40 mg QD. He had 3 episodes of epigastic discomfort ("uneasy feeling") that "builds into" nausea/vomiting. He did not notice a significant difference between famotidine and omeprazole. He said Dr. Florian thought omeprazole could affect his kidneys.  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 He will have Moh's surgery for basal cell. He is now retired.  On 8/23/24, he said he noted no benefit after increasing Megace to BID. He recently gained 10 lbs while on vacation [place], but lost the weight once back here. He thought he gained weight during the trip because he made the commitment to eat TID and he did not have to cook the meals. He currently had no appetite. He had supplement drinks on-hand, but not taken it yet. Bowel habit was good 75% of the time. He had periodic very loose and thin BMs. He took Imodium PRN which helped with looser stools. He was taking famotidine QD and noted a benefit on it. He had L4-L5 stenosis and seeing a neurosurgeon.  Labs 5/9/23 - CMP normal except BUN 28, creatinine 1.4, albumin 3.3, AST 11. 5/8/23 - CBC normal except 11.6. BMP normal except sodium 135, BUN 32, creatinine 1.38. 5/7/23 - CMP normal except BUN 34, creatinine 1.46. Lipase normal. 5/21/21 - COVID positive. CBC normal except hgb 12.8, .2. CMP normal except BUN 30, creatinine 1.46, GFR 47. Chol 252, , non . 7/22/19 - CMP normal except BUN 29, creatinine 1.48, sodium 134, GFR 47. CBC normal except hgb 9.8, .2. Ferritin 344.40, TIBC 246, iron sat 55%, UIBC 110, transferrin 198, vitamin B-12 1253. Folate normal. 5/10/19 Hgb 9.4, .2, B12 > 2000, Folate 10.

## 2025-03-05 ENCOUNTER — TELEPHONE ENCOUNTER (OUTPATIENT)
Dept: URBAN - METROPOLITAN AREA CLINIC 105 | Facility: CLINIC | Age: 80
End: 2025-03-05

## 2025-03-05 RX ORDER — FAMOTIDINE 20 MG/1
1 TABLET TABLET, FILM COATED ORAL TWICE A DAY
Qty: 180 TABLET | Refills: 3 | OUTPATIENT
Start: 2024-03-01